# Patient Record
Sex: FEMALE | Race: WHITE | Employment: FULL TIME | URBAN - METROPOLITAN AREA
[De-identification: names, ages, dates, MRNs, and addresses within clinical notes are randomized per-mention and may not be internally consistent; named-entity substitution may affect disease eponyms.]

---

## 2017-08-09 ENCOUNTER — OFFICE VISIT (OUTPATIENT)
Dept: FAMILY MEDICINE | Facility: CLINIC | Age: 42
End: 2017-08-09
Payer: COMMERCIAL

## 2017-08-09 VITALS
HEART RATE: 63 BPM | SYSTOLIC BLOOD PRESSURE: 113 MMHG | TEMPERATURE: 98.2 F | HEIGHT: 63 IN | BODY MASS INDEX: 29.06 KG/M2 | DIASTOLIC BLOOD PRESSURE: 79 MMHG | WEIGHT: 164 LBS

## 2017-08-09 DIAGNOSIS — R22.9 LOCALIZED SUPERFICIAL SWELLING, MASS, OR LUMP: Primary | ICD-10-CM

## 2017-08-09 PROCEDURE — 99213 OFFICE O/P EST LOW 20 MIN: CPT | Performed by: FAMILY MEDICINE

## 2017-08-09 ASSESSMENT — PATIENT HEALTH QUESTIONNAIRE - PHQ9
5. POOR APPETITE OR OVEREATING: NOT AT ALL
5. POOR APPETITE OR OVEREATING: NOT AT ALL
SUM OF ALL RESPONSES TO PHQ QUESTIONS 1-9: 5

## 2017-08-09 ASSESSMENT — ANXIETY QUESTIONNAIRES
GAD7 TOTAL SCORE: 0
6. BECOMING EASILY ANNOYED OR IRRITABLE: SEVERAL DAYS
1. FEELING NERVOUS, ANXIOUS, OR ON EDGE: SEVERAL DAYS
1. FEELING NERVOUS, ANXIOUS, OR ON EDGE: NOT AT ALL
2. NOT BEING ABLE TO STOP OR CONTROL WORRYING: NOT AT ALL
3. WORRYING TOO MUCH ABOUT DIFFERENT THINGS: NOT AT ALL
2. NOT BEING ABLE TO STOP OR CONTROL WORRYING: NOT AT ALL
5. BEING SO RESTLESS THAT IT IS HARD TO SIT STILL: NOT AT ALL
7. FEELING AFRAID AS IF SOMETHING AWFUL MIGHT HAPPEN: NOT AT ALL
6. BECOMING EASILY ANNOYED OR IRRITABLE: NOT AT ALL
5. BEING SO RESTLESS THAT IT IS HARD TO SIT STILL: NOT AT ALL
GAD7 TOTAL SCORE: 2
7. FEELING AFRAID AS IF SOMETHING AWFUL MIGHT HAPPEN: NOT AT ALL
3. WORRYING TOO MUCH ABOUT DIFFERENT THINGS: NOT AT ALL

## 2017-08-09 NOTE — LETTER
My Depression Action Plan  Name: Romina Turner   Date of Birth 1975  Date: 8/9/2017    My doctor: He Duarte   My clinic: [facility]  [address] [address]  [phone]          GREEN    ZONE   Good Control    What it looks like:     Things are going generally well. You have normal up s and down s. You may even feel depressed from time to time, but bad moods usually last less than a day.   What you need to do:  1. Continue to care for yourself (see self care plan)  2. Check your depression survival kit and update it as needed  3. Follow your physician s recommendations including any medication.  4. Do not stop taking medication unless you consult with your physician first.           YELLOW         ZONE Getting Worse    What it looks like:     Depression is starting to interfere with your life.     It may be hard to get out of bed; you may be starting to isolate yourself from others.    Symptoms of depression are starting to last most all day and this has happened for several days.     You may have suicidal thoughts but they are not constant.   What you need to do:     1. Call your care team, your response to treatment will improve if you keep your care team informed of your progress. Yellow periods are signs an adjustment may need to be made.     2. Continue your self-care, even if you have to fake it!    3. Talk to someone in your support network    4. Open up your depression survival kit           RED    ZONE Medical Alert - Get Help    What it looks like:     Depression is seriously interfering with your life.     You may experience these or other symptoms: You can t get out of bed most days, can t work or engage in other necessary activities, you have trouble taking care of basic hygiene, or basic responsibilities, thoughts of suicide or death that will not go away, self-injurious behavior.     What you need to do:  1. Call your care team  and request a same-day appointment. If they are not available (weekends or after hours) call your local crisis line, emergency room or 911.      Electronically signed by: Raegan Jain, August 9, 2017    Depression Self Care Plan / Survival Kit    Self-Care for Depression  Here s the deal. Your body and mind are really not as separate as most people think.  What you do and think affects how you feel and how you feel influences what you do and think. This means if you do things that people who feel good do, it will help you feel better.  Sometimes this is all it takes.  There is also a place for medication and therapy depending on how severe your depression is, so be sure to consult with your medical provider and/ or Behavioral Health Consultant if your symptoms are worsening or not improving.     In order to better manage my stress, I will:    Exercise  Get some form of exercise, every day. This will help reduce pain and release endorphins, the  feel good  chemicals in your brain. This is almost as good as taking antidepressants!  This is not the same as joining a gym and then never going! (they count on that by the way ) It can be as simple as just going for a walk or doing some gardening, anything that will get you moving.      Hygiene   Maintain good hygiene (Get out of bed in the morning, Make your bed, Brush your teeth, Take a shower, and Get dressed like you were going to work, even if you are unemployed).  If your clothes don't fit try to get ones that do.    Diet  I will strive to eat foods that are good for me, drink plenty of water, and avoid excessive sugar, caffeine, alcohol, and other mood-altering substances.  Some foods that are helpful in depression are: complex carbohydrates, B vitamins, flaxseed, fish or fish oil, fresh fruits and vegetables.    Psychotherapy  I agree to participate in Individual Therapy (if recommended).    Medication  If prescribed medications, I agree to take them.   Missing doses can result in serious side effects.  I understand that drinking alcohol, or other illicit drug use, may cause potential side effects.  I will not stop my medication abruptly without first discussing it with my provider.    Staying Connected With Others  I will stay in touch with my friends, family members, and my primary care provider/team.    Use your imagination  Be creative.  We all have a creative side; it doesn t matter if it s oil painting, sand castles, or mud pies! This will also kick up the endorphins.    Witness Beauty  (AKA stop and smell the roses) Take a look outside, even in mid-winter. Notice colors, textures. Watch the squirrels and birds.     Service to others  Be of service to others.  There is always someone else in need.  By helping others we can  get out of ourselves  and remember the really important things.  This also provides opportunities for practicing all the other parts of the program.    Humor  Laugh and be silly!  Adjust your TV habits for less news and crime-drama and more comedy.    Control your stress  Try breathing deep, massage therapy, biofeedback, and meditation. Find time to relax each day.     My support system    Clinic Contact:  Phone number:    Contact 1:  Phone number:    Contact 2:  Phone number:    Oriental orthodox/:  Phone number:    Therapist:  Phone number:    Local crisis center:    Phone number:    Other community support:  Phone number:

## 2017-08-09 NOTE — MR AVS SNAPSHOT
After Visit Summary   8/9/2017    Romina Turner    MRN: [id number]           Patient Information     Date Of Birth          1975        Visit Information        Provider Department      8/9/2017 8:20 AM He Duarte MD [facility]        Today's Diagnoses     Localized superficial swelling, mass, or lump    -  1      Care Instructions          Thank you for choosing [facility].  You may be receiving a survey in the mail from [facility] regarding your visit today.  Please take a few minutes to complete and return the survey to let us know how we are doing.      If you have questions or concerns, please contact us via Movellas or you can contact your care team at [phone].    Our Clinic hours are:  Monday 6:40 am  to 7:00 pm  Tuesday -Friday 6:40 am to 5:00 pm    The [place] outpatient lab hours are:  Monday - Friday 6:10 am to 4:45 pm  Saturdays 7:00 am to 11:00 am  Appointments are required, call [phone]    If you have clinical questions after hours or would like to schedule an appointment,  call the clinic at [phone].          Follow-ups after your visit        Future tests that were ordered for you today     Open Future Orders        Priority Expected Expires Ordered    US Chest Wall Routine  8/9/2018 8/9/2017            Who to contact     If you have questions or need follow up information about today's clinic visit or your schedule please contact [facility] directly at [phone].  Normal or non-critical lab and imaging results will be communicated to you by [employer], letter or phone within 4 business days after the clinic has received the results. If you do not hear from us within 7 days, please contact the clinic through Unfoldt or phone. If you have a critical or abnormal lab result, we will notify you by phone as soon as possible.  Submit refill requests through [employer]ellas or call your pharmacy and they will forward the refill  "request to us. Please allow 3 business days for your refill to be completed.          Additional Information About Your Visit        MyChart Information     [email or web address] lets you send messages to your doctor, view your test results, renew your prescriptions, schedule appointments and more. To sign up, go to [email or web address] . Click on \"Log in\" on the left side of the screen, which will take you to the Welcome page. Then click on \"Sign up Now\" on the right side of the page.     You will be asked to enter the access code listed below, as well as some personal information. Please follow the directions to create your username and password.     Your access code is: [email or web address]  Expires: 2017  9:15 AM     Your access code will  in 90 days. If you need help or a new code, please call your [place] clinic or [phone].        Care EveryWhere ID     This is your Care EveryWhere ID. This could be used by other organizations to access your [place] medical records  [id number]        Your Vitals Were     Pulse Temperature Height BMI (Body Mass Index)          63 98.2  F (36.8  C) (Tympanic) 5' 3\" (1.6 m) 29.05 kg/m2         Blood Pressure from Last 3 Encounters:   17 113/79   09/15/16 112/77   16 117/75    Weight from Last 3 Encounters:   17 164 lb (74.4 kg)   09/15/16 159 lb (72.1 kg)   16 153 lb (69.4 kg)              We Performed the Following     DEPRESSION ACTION PLAN (DAP)        Primary Care Provider Office Phone # Fax #    He Duarte [phone] [address] [facility] [address]        Equal Access to Services     ESPERANZA HANDLEY : [place], kranthi macario, jeremi lin. So [facility] [phone].    ATENCIÓN: Si habla español, tiene a hdz disposición servicios gratuitos de asistencia lingüística. Stephan al [phone].    We comply with applicable federal civil rights " laws and [place] laws. We do not discriminate on the basis of race, color, national origin, age, disability sex, sexual orientation or gender identity.            Thank you!     Thank you for choosing [facility]  for your care. Our goal is always to provide you with excellent care. Hearing back from our patients is one way we can continue to improve our services. Please take a few minutes to complete the written survey that you may receive in the mail after your visit with us. Thank you!             Your Updated Medication List - Protect others around you: Learn how to safely use, store and throw away your medicines at www.disposemymeds.org.          This list is accurate as of: 8/9/17  9:15 AM.  Always use your most recent med list.                   Brand Name Dispense Instructions for use Diagnosis    escitalopram 10 MG tablet    LEXAPRO    30 tablet    Take 1 tablet (10 mg) by mouth daily    Depression with anxiety       LAMICTAL  MG Tbdp ODT tab   Generic drug:  lamoTRIgine     60 tablet    Take 1 tablet (100 mg) by mouth daily        NO ACTIVE MEDICATIONS      .        oxybutynin 5 MG 24 hr tablet    DITROPAN XL    30 tablet    Take 1 tablet (5 mg) by mouth daily    Female stress incontinence       valACYclovir 1000 mg tablet    VALTREX    21 tablet    Take 1 tablet (1,000 mg) by mouth 3 times daily for 7 days    Herpes zoster without complication

## 2017-08-09 NOTE — PROGRESS NOTES
SUBJECTIVE:                                                    Romina Turner is a 42 year old female who presents to clinic today for the following health issues:      Concern - Lump R side under breast   Onset: 2 weeks     Description:   Patient noticed lump 2 weeks ago it is uncomfortable with sitting and at night     Intensity: moderate    Progression of Symptoms:  same    Accompanying Signs & Symptoms:  None     Previous history of similar problem:   None     Precipitating factors:   Worsened by: in the evening and sitting     Alleviating factors:  Improved by: none     Therapies Tried and outcome: none       History as above, here for a lump discovered about two weeks ago. Lump is underneath her  Right breast in the rib cage area. Lump is pea sized and tender to the touch. She recalls no trauma to the chest wall. No other systemic symptoms reported.       Problem list and histories reviewed & adjusted, as indicated.  Additional history: as documented    Patient Active Problem List   Diagnosis     CARDIOVASCULAR SCREENING; LDL GOAL LESS THAN 160     Depression with anxiety     History reviewed. No pertinent surgical history.    Social History   Substance Use Topics     Smoking status: Never Smoker     Smokeless tobacco: Never Used     Alcohol use Yes      Comment: RARE     Family History   Problem Relation Age of Onset     Hypertension Mother      Eye Disorder Mother      HEART DISEASE Mother      Depression Mother      Hyperlipidemia Mother      CANCER Father      BLADER     Family History Negative No family hx of          Current Outpatient Prescriptions   Medication Sig Dispense Refill     lamoTRIgine (LAMICTAL ODT) 100 MG TBDP Take 1 tablet (100 mg) by mouth daily 60 tablet      oxybutynin (DITROPAN XL) 5 MG 24 hr tablet Take 1 tablet (5 mg) by mouth daily (Patient not taking: Reported on 8/9/2017) 30 tablet 3     escitalopram (LEXAPRO) 10 MG tablet Take 1 tablet (10 mg) by mouth daily (Patient not  "taking: Reported on 8/9/2017) 30 tablet 1     valACYclovir (VALTREX) 1000 mg tablet Take 1 tablet (1,000 mg) by mouth 3 times daily for 7 days 21 tablet 0     NO ACTIVE MEDICATIONS .       Allergies   Allergen Reactions     Penicillins Rash     Sulfa Drugs Rash         Reviewed and updated as needed this visit by clinical staffAllergies  Med Hx  Surg Hx  Fam Hx  Soc Hx      Reviewed and updated as needed this visit by Provider         ROS:  Constitutional, HEENT, cardiovascular, pulmonary, gi and gu systems are negative, except as otherwise noted.      OBJECTIVE:   /79 (BP Location: Left arm, Cuff Size: Adult Regular)  Pulse 63  Temp 98.2  F (36.8  C) (Tympanic)  Ht 5' 3\" (1.6 m)  Wt 164 lb (74.4 kg)  BMI 29.05 kg/m2  Body mass index is 29.05 kg/(m^2).  GENERAL: healthy, alert and no distress  BREAST: normal without masses, tenderness or nipple discharge and no palpable axillary masses or adenopathy  Chest Wall : Pea sized lump inferior to the right breast , tender to palpation.   Diagnostic Test Results:  none     ASSESSMENT/PLAN:         (R22.9) Localized superficial swelling, mass, or lump  (primary encounter diagnosis)  Comment: Patient referred for ultrasound  Plan: US Chest Wall              FUTURE APPOINTMENTS:       - Follow-up office as needed.    He Duarte MD  [facility]  "

## 2017-08-09 NOTE — PATIENT INSTRUCTIONS
Thank you for choosing [facility].  You may be receiving a survey in the mail from [employer] regarding your visit today.  Please take a few minutes to complete and return the survey to let us know how we are doing.      If you have questions or concerns, please contact us via Qiandao or you can contact your care team at [phone].    Our Clinic hours are:  Monday 6:40 am  to 7:00 pm  Tuesday -Friday 6:40 am to 5:00 pm    The [place] outpatient lab hours are:  Monday - Friday 6:10 am to 4:45 pm  Saturdays 7:00 am to 11:00 am  Appointments are required, call [phone]    If you have clinical questions after hours or would like to schedule an appointment,  call the clinic at [phone].

## 2017-08-09 NOTE — NURSING NOTE
"Chief Complaint   Patient presents with     Mass       Initial /79 (BP Location: Left arm, Cuff Size: Adult Regular)  Pulse 63  Temp 98.2  F (36.8  C) (Tympanic)  Ht 5' 3\" (1.6 m)  Wt 164 lb (74.4 kg)  BMI 29.05 kg/m2 Estimated body mass index is 29.05 kg/(m^2) as calculated from the following:    Height as of this encounter: 5' 3\" (1.6 m).    Weight as of this encounter: 164 lb (74.4 kg).  Medication Reconciliation: complete  "

## 2017-08-10 ASSESSMENT — ANXIETY QUESTIONNAIRES: GAD7 TOTAL SCORE: 0

## 2017-11-10 ENCOUNTER — OFFICE VISIT (OUTPATIENT)
Dept: FAMILY MEDICINE | Facility: CLINIC | Age: 42
End: 2017-11-10
Payer: COMMERCIAL

## 2017-11-10 ENCOUNTER — HOSPITAL ENCOUNTER (OUTPATIENT)
Dept: ULTRASOUND IMAGING | Facility: CLINIC | Age: 42
Discharge: HOME | End: 2017-11-10
Attending: FAMILY MEDICINE | Admitting: FAMILY MEDICINE
Payer: COMMERCIAL

## 2017-11-10 VITALS
SYSTOLIC BLOOD PRESSURE: 113 MMHG | TEMPERATURE: 98.9 F | BODY MASS INDEX: 29.05 KG/M2 | WEIGHT: 164 LBS | HEART RATE: 86 BPM | DIASTOLIC BLOOD PRESSURE: 74 MMHG

## 2017-11-10 DIAGNOSIS — R22.9 LOCALIZED SUPERFICIAL SWELLING, MASS, OR LUMP: ICD-10-CM

## 2017-11-10 DIAGNOSIS — R07.89 XIPHOID PAIN: Primary | ICD-10-CM

## 2017-11-10 PROCEDURE — 99213 OFFICE O/P EST LOW 20 MIN: CPT | Performed by: FAMILY MEDICINE

## 2017-11-10 PROCEDURE — 76604 US EXAM CHEST: CPT

## 2017-11-10 NOTE — MR AVS SNAPSHOT
After Visit Summary   11/10/2017    Romina Turner    MRN: [id number]           Patient Information     Date Of Birth          1975        Visit Information        Provider Department      11/10/2017 11:00 AM He Duarte MD [facility]        Today's Diagnoses     Xiphoid pain    -  1      Care Instructions      Chest Wall Pain: Costochondritis    The chest pain that you have had today is caused by costochondritis. This condition is caused by an inflammation of the cartilage joining your ribs to your breastbone. It is not caused by heart or lung problems. Your healthcare team has made sure that the chest pain you feel is not from a life threatening cause of chest pain such as heart attack, collapsed lung, blood clot in the lung, tear in the aorta, or esophageal rupture. The inflammation may have been brought on by a blow to the chest, lifting heavy objects, intense exercise, or an illness that made you cough and sneeze a lot. It often occurs during times of emotional stress. It can be painful, but it is not dangerous. It usually goes away in 1 to 2 weeks. But it may happen again. Rarely, a more serious condition may cause symptoms similar to costochondritis. That s why it s important to watch for the warning signs listed below.  Home care  Follow these guidelines when caring for yourself at home:    If you feel that emotional stress is a cause of your condition, try to figure out the sources of that stress. It may not be obvious. Learn ways to deal with the stress in your life. This can include regular exercise, muscle relaxation, meditation, or simply taking time out for yourself.    You may use acetaminophen, ibuprofen, or naproxen to control pain, unless another pain medicine was prescribed. If you have liver or kidney disease or ever had a stomach ulcer, talk with your healthcare provider before using these medicines.    You can also help ease pain by using a hot,  wet compress or heating pad. Use this with or without a medicated skin cream that helps relieves pain.    Do stretching exercise as advised by your provider.    Take any prescribed medicines as directed.  Follow-up care  Follow up with your healthcare provider, or as advised, if you do not start to get better in the next 2 days.  When to seek medical advice  Call your healthcare provider right away if any of these occur:    A change in the type of pain. Call if it feels different, becomes more serious, lasts longer, or spreads into your shoulder, arm, neck, jaw, or back.    Shortness of breath or pain gets worse when you breathe    Weakness, dizziness, or fainting    Cough with dark-colored sputum (phlegm) or blood    Abdominal pain    Dark red or black stools    Fever of 100.4 F (38 C) or higher, or as directed by your healthcare provider  Date Last Reviewed: 12/1/2016 2000-2017 [employer]. [address] [place] [address]. All rights reserved. This information is not intended as a substitute for professional medical care. Always follow your healthcare professional's instructions.                Follow-ups after your visit        Future tests that were ordered for you today     Open Future Orders        Priority Expected Expires Ordered    CT Chest w/o Contrast Routine  11/10/2018 11/10/2017            Who to contact     If you have questions or need follow up information about today's clinic visit or your schedule please contact [facility] directly at [phone].  Normal or non-critical lab and imaging results will be communicated to you by MyChart, letter or phone within 4 business days after the clinic has received the results. If you do not hear from us within 7 days, please contact the clinic through MyChart or phone. If you have a critical or abnormal lab result, we will notify you by phone as soon as possible.  Submit refill requests through [employer]ous[employer]hart or call your  "pharmacy and they will forward the refill request to us. Please allow 3 business days for your refill to be completed.          Additional Information About Your Visit        MyChart Information     [employer] lets you send messages to your doctor, view your test results, renew your prescriptions, schedule appointments and more. To sign up, go to [email or web address] . Click on \"Log in\" on the left side of the screen, which will take you to the Welcome page. Then click on \"Sign up Now\" on the right side of the page.     You will be asked to enter the access code listed below, as well as some personal information. Please follow the directions to create your username and password.     Your access code is: [email or web address]  Expires: 2018 11:25 AM     Your access code will  in 90 days. If you need help or a new code, please call your [place] clinic or [phone].        Care EveryWhere ID     This is your Care EveryWhere ID. This could be used by other organizations to access your [place] medical records  [id number]        Your Vitals Were     Pulse Temperature BMI (Body Mass Index)             86 98.9  F (37.2  C) (Tympanic) 29.05 kg/m2          Blood Pressure from Last 3 Encounters:   11/10/17 113/74   17 113/79   09/15/16 112/77    Weight from Last 3 Encounters:   11/10/17 164 lb (74.4 kg)   17 164 lb (74.4 kg)   09/15/16 159 lb (72.1 kg)               Primary Care Provider Office Phone # Fax #    Emilesandra Oumar Duarte [phone] [address] [facility] [address]        Equal Access to Services     MIKE HANDLEY : [place], kranthi macario, jeremi lin. So [facility] [phone].    ATENCIÓN: Si habla español, tiene a hdz disposición servicios gratuitos de asistencia lingüística. Llame al [phone].    We comply with applicable federal civil rights laws and [place] laws. We do not " discriminate on the basis of race, color, national origin, age, disability, sex, sexual orientation, or gender identity.            Thank you!     Thank you for choosing [facility]  for your care. Our goal is always to provide you with excellent care. Hearing back from our patients is one way we can continue to improve our services. Please take a few minutes to complete the written survey that you may receive in the mail after your visit with us. Thank you!             Your Updated Medication List - Protect others around you: Learn how to safely use, store and throw away your medicines at www.disposemymeds.org.          This list is accurate as of: 11/10/17 11:25 AM.  Always use your most recent med list.                   Brand Name Dispense Instructions for use Diagnosis    escitalopram 10 MG tablet    LEXAPRO    30 tablet    Take 1 tablet (10 mg) by mouth daily    Depression with anxiety       LAMICTAL  MG Tbdp ODT tab   Generic drug:  lamoTRIgine     60 tablet    Take 1 tablet (100 mg) by mouth daily        NO ACTIVE MEDICATIONS      .        oxybutynin 5 MG 24 hr tablet    DITROPAN XL    30 tablet    Take 1 tablet (5 mg) by mouth daily    Female stress incontinence       valACYclovir 1000 mg tablet    VALTREX    21 tablet    Take 1 tablet (1,000 mg) by mouth 3 times daily for 7 days    Herpes zoster without complication

## 2017-11-10 NOTE — NURSING NOTE
"Initial /74 (BP Location: Left arm, Patient Position: Chair, Cuff Size: Adult Regular)  Pulse 86  Temp 98.9  F (37.2  C) (Tympanic)  Wt 164 lb (74.4 kg)  BMI 29.05 kg/m2 Estimated body mass index is 29.05 kg/(m^2) as calculated from the following:    Height as of 8/9/17: 5' 3\" (1.6 m).    Weight as of this encounter: 164 lb (74.4 kg). .    Jazmine Olivarez    "

## 2017-11-10 NOTE — PROGRESS NOTES
SUBJECTIVE:   Romina Turner is a 42 year old female who presents to clinic today for the following health issues:      Follow up on lump-sternum. States it is worse    Patient is a 42 yr old female here for lump on her chest wall . She had an ultrasound which was negative . She noticed the pain more with bending and crouching. No association with breathing .    Problem list and histories reviewed & adjusted, as indicated.  Additional history: as documented    Patient Active Problem List   Diagnosis     CARDIOVASCULAR SCREENING; LDL GOAL LESS THAN 160     Depression with anxiety     No past surgical history on file.    Social History   Substance Use Topics     Smoking status: Never Smoker     Smokeless tobacco: Never Used     Alcohol use Yes      Comment: RARE     Family History   Problem Relation Age of Onset     Hypertension Mother      Eye Disorder Mother      HEART DISEASE Mother      Depression Mother      Hyperlipidemia Mother      CANCER Father      BLADER     Family History Negative No family hx of          Current Outpatient Prescriptions   Medication Sig Dispense Refill     lamoTRIgine (LAMICTAL ODT) 100 MG TBDP Take 1 tablet (100 mg) by mouth daily 60 tablet      oxybutynin (DITROPAN XL) 5 MG 24 hr tablet Take 1 tablet (5 mg) by mouth daily (Patient not taking: Reported on 8/9/2017) 30 tablet 3     escitalopram (LEXAPRO) 10 MG tablet Take 1 tablet (10 mg) by mouth daily (Patient not taking: Reported on 8/9/2017) 30 tablet 1     valACYclovir (VALTREX) 1000 mg tablet Take 1 tablet (1,000 mg) by mouth 3 times daily for 7 days 21 tablet 0     NO ACTIVE MEDICATIONS .       Allergies   Allergen Reactions     Penicillins Rash     Sulfa Drugs Rash     BP Readings from Last 3 Encounters:   11/10/17 113/74   08/09/17 113/79   09/15/16 112/77    Wt Readings from Last 3 Encounters:   11/10/17 164 lb (74.4 kg)   08/09/17 164 lb (74.4 kg)   09/15/16 159 lb (72.1 kg)                  Labs reviewed in  EPIC        Reviewed and updated as needed this visit by clinical staff       Reviewed and updated as needed this visit by Provider         ROS:  Constitutional, HEENT, cardiovascular, pulmonary, gi and gu systems are negative, except as otherwise noted.      OBJECTIVE:   /74 (BP Location: Left arm, Patient Position: Chair, Cuff Size: Adult Regular)  Pulse 86  Temp 98.9  F (37.2  C) (Tympanic)  Wt 164 lb (74.4 kg)  BMI 29.05 kg/m2  Body mass index is 29.05 kg/(m^2).  GENERAL: healthy, alert and no distress  NECK: no adenopathy, no asymmetry, masses, or scars and thyroid normal to palpation  RESP: lungs clear to auscultation - no rales, rhonchi or wheezes  CV: regular rate and rhythm, normal S1 S2, no S3 or S4, no murmur, click or rub, no peripheral edema and peripheral pulses strong  MS: no gross musculoskeletal defects noted, no edema    Diagnostic Test Results:  none     ASSESSMENT/PLAN:         1. Xiphoid pain  Patient will be notified of results   - CT Chest w/o Contrast; Future    FUTURE APPOINTMENTS:       - Follow-up visit as needed    He Duarte MD  [facility]

## 2017-11-10 NOTE — PATIENT INSTRUCTIONS
Chest Wall Pain: Costochondritis    The chest pain that you have had today is caused by costochondritis. This condition is caused by an inflammation of the cartilage joining your ribs to your breastbone. It is not caused by heart or lung problems. Your healthcare team has made sure that the chest pain you feel is not from a life threatening cause of chest pain such as heart attack, collapsed lung, blood clot in the lung, tear in the aorta, or esophageal rupture. The inflammation may have been brought on by a blow to the chest, lifting heavy objects, intense exercise, or an illness that made you cough and sneeze a lot. It often occurs during times of emotional stress. It can be painful, but it is not dangerous. It usually goes away in 1 to 2 weeks. But it may happen again. Rarely, a more serious condition may cause symptoms similar to costochondritis. That s why it s important to watch for the warning signs listed below.  Home care  Follow these guidelines when caring for yourself at home:    If you feel that emotional stress is a cause of your condition, try to figure out the sources of that stress. It may not be obvious. Learn ways to deal with the stress in your life. This can include regular exercise, muscle relaxation, meditation, or simply taking time out for yourself.    You may use acetaminophen, ibuprofen, or naproxen to control pain, unless another pain medicine was prescribed. If you have liver or kidney disease or ever had a stomach ulcer, talk with your healthcare provider before using these medicines.    You can also help ease pain by using a hot, wet compress or heating pad. Use this with or without a medicated skin cream that helps relieves pain.    Do stretching exercise as advised by your provider.    Take any prescribed medicines as directed.  Follow-up care  Follow up with your healthcare provider, or as advised, if you do not start to get better in the next 2 days.  When to seek medical  advice  Call your healthcare provider right away if any of these occur:    A change in the type of pain. Call if it feels different, becomes more serious, lasts longer, or spreads into your shoulder, arm, neck, jaw, or back.    Shortness of breath or pain gets worse when you breathe    Weakness, dizziness, or fainting    Cough with dark-colored sputum (phlegm) or blood    Abdominal pain    Dark red or black stools    Fever of 100.4 F (38 C) or higher, or as directed by your healthcare provider  Date Last Reviewed: 12/1/2016 2000-2017 [employer]. [address] [place] [address]. All rights reserved. This information is not intended as a substitute for professional medical care. Always follow your healthcare professional's instructions.

## 2017-11-14 ENCOUNTER — HOSPITAL ENCOUNTER (OUTPATIENT)
Dept: CT IMAGING | Facility: CLINIC | Age: 42
Discharge: HOME | End: 2017-11-14
Attending: FAMILY MEDICINE | Admitting: FAMILY MEDICINE
Payer: COMMERCIAL

## 2017-11-14 DIAGNOSIS — R07.89 XIPHOID PAIN: ICD-10-CM

## 2017-11-14 PROCEDURE — 71250 CT THORAX DX C-: CPT

## 2017-11-16 ENCOUNTER — TELEPHONE (OUTPATIENT)
Dept: FAMILY MEDICINE | Facility: CLINIC | Age: 42
End: 2017-11-16

## 2017-11-16 DIAGNOSIS — N63.10 BREAST MASS, RIGHT: Primary | ICD-10-CM

## 2017-11-16 NOTE — TELEPHONE ENCOUNTER
The ultrasound that was done was not on her breast , for any mass seen on breast an ultrasound is always indicated.

## 2017-11-16 NOTE — TELEPHONE ENCOUNTER
I have given the pt the below information. She is questioning the need for a repeat US as one was just done on 11/10/17.   Please advise on this question.   Thank you,  Ann Hodge RN

## 2017-11-16 NOTE — PROGRESS NOTES
Please inform patient that test result CT scan showed a soft tissue swelling in her right breast. It was recommended that she get a mammogram to investigate this I will place the order for this. There was also a 5mm nodule seen on her right lung. If she was a smoker she will need that rechecked in one year.  Thank you.     He Duarte M.D.

## 2017-11-16 NOTE — TELEPHONE ENCOUNTER
The below information has been given to the pt. She has these two tests scheduled.   Ann Hodge RN

## 2017-11-16 NOTE — TELEPHONE ENCOUNTER
Notes Recorded by He Duarte MD on 11/16/2017 at 7:17 AM  Please inform patient that test result CT scan showed a soft tissue swelling in her right breast. It was recommended that she get a mammogram to investigate this I will place the order for this. There was also a 5mm nodule seen on her right lung. If she was a smoker she will need that rechecked in one year.  Thank you.     He Duarte M.D.

## 2017-11-16 NOTE — TELEPHONE ENCOUNTER
I do not see that the mammogram order has been placed. Can you please place the order.  Thank you,  Ann Hodge RN

## 2017-11-28 ENCOUNTER — TELEPHONE (OUTPATIENT)
Dept: FAMILY MEDICINE | Facility: CLINIC | Age: 42
End: 2017-11-28

## 2017-11-28 ENCOUNTER — HOSPITAL ENCOUNTER (OUTPATIENT)
Dept: ULTRASOUND IMAGING | Facility: CLINIC | Age: 42
End: 2017-11-28
Attending: FAMILY MEDICINE
Payer: COMMERCIAL

## 2017-11-28 ENCOUNTER — HOSPITAL ENCOUNTER (OUTPATIENT)
Dept: MAMMOGRAPHY | Facility: CLINIC | Age: 42
Discharge: HOME | End: 2017-11-28
Attending: FAMILY MEDICINE | Admitting: FAMILY MEDICINE
Payer: COMMERCIAL

## 2017-11-28 ENCOUNTER — ALLIED HEALTH/NURSE VISIT (OUTPATIENT)
Dept: FAMILY MEDICINE | Facility: CLINIC | Age: 42
End: 2017-11-28
Payer: COMMERCIAL

## 2017-11-28 DIAGNOSIS — N63.10 BREAST MASS, RIGHT: ICD-10-CM

## 2017-11-28 DIAGNOSIS — R07.89 XIPHOID PAIN: Primary | ICD-10-CM

## 2017-11-28 PROCEDURE — 99207 ZZC NO CHARGE NURSE ONLY: CPT

## 2017-11-28 PROCEDURE — 76642 ULTRASOUND BREAST LIMITED: CPT | Mod: RT

## 2017-11-28 PROCEDURE — G0204 DX MAMMO INCL CAD BI: HCPCS

## 2017-11-28 NOTE — NURSING NOTE
Pt in clinic today with negative MA and US of R breast.  States her pain continues to be present, not worse, but symptoms remain.  Pt is asking next steps given negative diagnostics at this point.  Scheduled with Dr. Duarte in clinic on Friday at 6:40 AM to discuss.  Will send Dr. Duarte note and ask if there is anything she would like to do differently with pt between now and then?    Amanda LEWIS RN

## 2017-11-28 NOTE — TELEPHONE ENCOUNTER
Pt in clinic today after completing MA and US of R breast and given negative results by radiologist.  States her pain continues to be present, not worse, but symptoms remain.  Pt is asking next steps given negative diagnostics at this point.  Scheduled with Dr. Duarte in clinic on Friday at 6:40 AM to discuss.  Dr. Duarte is there anything you would like to do differently with pt between now and then?    Amanda LEWIS RN

## 2017-11-28 NOTE — MR AVS SNAPSHOT
"              After Visit Summary   11/28/2017    Romina Turner    MRN: [id number]           Patient Information     Date Of Birth          1975        Visit Information        Provider Department      11/28/2017 11:15 AM CRISTINA MAYFIELD/LAXMI PEGUERO [facility]        Today's Diagnoses     Xiphoid pain    -  1       Follow-ups after your visit        Your next 10 appointments already scheduled     Dec 01, 2017  6:40 AM CST   [facility] with He Duarte MD   [facility]    [address] [facility] [id number] [phone]              Who to contact     If you have questions or need follow up information about today's clinic visit or your schedule please contact [facility] directly at [phone].  Normal or non-critical lab and imaging results will be communicated to you by MyChart, letter or phone within 4 business days after the clinic has received the results. If you do not hear from us within 7 days, please contact the clinic through MyChart or phone. If you have a critical or abnormal lab result, we will notify you by phone as soon as possible.  Submit refill requests through [employer] or call your pharmacy and they will forward the refill request to us. Please allow 3 business days for your refill to be completed.          Additional Information About Your Visit        MyChart Information     [employer] lets you send messages to your doctor, view your test results, renew your prescriptions, schedule appointments and more. To sign up, go to [email or web address] . Click on \"Log in\" on the left side of the screen, which will take you to the Welcome page. Then click on \"Sign up Now\" on the right side of the page.     You will be asked to enter the access code listed below, as well as some personal information. Please follow the directions to create your username and password.     Your access code is: [email or web address]  Expires: 2/8/2018 " 11:25 AM     Your access code will  in 90 days. If you need help or a new code, please call your [place] clinic or [phone].        Care EveryWhere ID     This is your Care EveryWhere ID. This could be used by other organizations to access your [place] medical records  [id number]         Blood Pressure from Last 3 Encounters:   11/10/17 113/74   17 113/79   09/15/16 112/77    Weight from Last 3 Encounters:   11/10/17 164 lb (74.4 kg)   17 164 lb (74.4 kg)   09/15/16 159 lb (72.1 kg)              Today, you had the following     No orders found for display       Primary Care Provider Office Phone # Fax #    He Duarte [phone] [address] [facility] [address]        Equal Access to Services     ESPERANZA HANDLEY : Hadii aad ku hadasho Sojjali, waaxda luqadaha, qaybta kaalmada adeegyada, waxay flaquitain hayarvinn pratima brown . So [facility] [phone].    ATENCIÓN: Si habla español, tiene a hdz disposición servicios gratuitos de asistencia lingüística. Llame al [phone].    We comply with applicable federal civil rights laws and [place] laws. We do not discriminate on the basis of race, color, national origin, age, disability, sex, sexual orientation, or gender identity.            Thank you!     Thank you for choosing [facility]  for your care. Our goal is always to provide you with excellent care. Hearing back from our patients is one way we can continue to improve our services. Please take a few minutes to complete the written survey that you may receive in the mail after your visit with us. Thank you!             Your Updated Medication List - Protect others around you: Learn how to safely use, store and throw away your medicines at www.disposemymeds.org.          This list is accurate as of: 17 11:20 AM.  Always use your most recent med list.                   Brand Name Dispense Instructions for use Diagnosis     escitalopram 10 MG tablet    LEXAPRO    30 tablet    Take 1 tablet (10 mg) by mouth daily    Depression with anxiety       LAMICTAL  MG Tbdp ODT tab   Generic drug:  lamoTRIgine     60 tablet    Take 1 tablet (100 mg) by mouth daily        NO ACTIVE MEDICATIONS      .        oxybutynin 5 MG 24 hr tablet    DITROPAN XL    30 tablet    Take 1 tablet (5 mg) by mouth daily    Female stress incontinence       valACYclovir 1000 mg tablet    VALTREX    21 tablet    Take 1 tablet (1,000 mg) by mouth 3 times daily for 7 days    Herpes zoster without complication

## 2017-12-01 ENCOUNTER — OFFICE VISIT (OUTPATIENT)
Dept: FAMILY MEDICINE | Facility: CLINIC | Age: 42
End: 2017-12-01
Payer: COMMERCIAL

## 2017-12-01 VITALS
HEART RATE: 72 BPM | HEIGHT: 63 IN | DIASTOLIC BLOOD PRESSURE: 75 MMHG | BODY MASS INDEX: 28.88 KG/M2 | WEIGHT: 163 LBS | TEMPERATURE: 97.9 F | SYSTOLIC BLOOD PRESSURE: 113 MMHG

## 2017-12-01 DIAGNOSIS — R10.11 ABDOMINAL PAIN, RIGHT UPPER QUADRANT: ICD-10-CM

## 2017-12-01 DIAGNOSIS — R07.89 XIPHOID PAIN: Primary | ICD-10-CM

## 2017-12-01 PROCEDURE — 99214 OFFICE O/P EST MOD 30 MIN: CPT | Performed by: FAMILY MEDICINE

## 2017-12-01 RX ORDER — TRAMADOL HYDROCHLORIDE 50 MG/1
50 TABLET ORAL EVERY 6 HOURS PRN
Qty: 20 TABLET | Refills: 0 | Status: SHIPPED | OUTPATIENT
Start: 2017-12-01 | End: 2017-12-15

## 2017-12-01 NOTE — PROGRESS NOTES
SUBJECTIVE:   Romina Turner is a 42 year old female who presents to clinic today for the following health issues:      Concern - xiphoid pressure   Onset: 7/ 2017    Description:   Patient has had pressure /uncomfortable feeling in the R side  of sterum area has been going on for about 6 mos seem to be worse in the evening     Intensity: moderate    Progression of Symptoms:  worsening    Accompanying Signs & Symptoms:  none    Previous history of similar problem:   none    Precipitating factors:   Worsened by: in the evening     Alleviating factors:  Improved by: postion of lying or sitting     Therapies Tried and outcome: same       42 yr old female here for continued pain in the center /right upper part of her abdomen. We have done ultrasound and CT of her chest and also ultrasound and mammogram of her breast . So far work up has been negative. She reports that the pain is constant now and more to the right of her abdomen in the upper part . There is no relation to food. Pain seems worse with position like crouching forward . No known relieving factors. Seems more noticeable in the evenings.     Problem list and histories reviewed & adjusted, as indicated.  Additional history: as documented    Patient Active Problem List   Diagnosis     CARDIOVASCULAR SCREENING; LDL GOAL LESS THAN 160     Depression with anxiety     History reviewed. No pertinent surgical history.    Social History   Substance Use Topics     Smoking status: Never Smoker     Smokeless tobacco: Never Used     Alcohol use Yes      Comment: RARE     Family History   Problem Relation Age of Onset     Hypertension Mother      Eye Disorder Mother      HEART DISEASE Mother      Depression Mother      Hyperlipidemia Mother      CANCER Father      BLADER     Family History Negative No family hx of          Current Outpatient Prescriptions   Medication Sig Dispense Refill     traMADol (ULTRAM) 50 MG tablet Take 1 tablet (50 mg) by mouth every 6 hours as  "needed for pain 20 tablet 0     lamoTRIgine (LAMICTAL ODT) 100 MG TBDP Take 1 tablet (100 mg) by mouth daily 60 tablet      oxybutynin (DITROPAN XL) 5 MG 24 hr tablet Take 1 tablet (5 mg) by mouth daily (Patient not taking: Reported on 8/9/2017) 30 tablet 3     escitalopram (LEXAPRO) 10 MG tablet Take 1 tablet (10 mg) by mouth daily (Patient not taking: Reported on 8/9/2017) 30 tablet 1     valACYclovir (VALTREX) 1000 mg tablet Take 1 tablet (1,000 mg) by mouth 3 times daily for 7 days 21 tablet 0     NO ACTIVE MEDICATIONS .       Allergies   Allergen Reactions     Penicillins Rash     Sulfa Drugs Rash     BP Readings from Last 3 Encounters:   12/01/17 113/75   11/10/17 113/74   08/09/17 113/79    Wt Readings from Last 3 Encounters:   12/01/17 163 lb (73.9 kg)   11/10/17 164 lb (74.4 kg)   08/09/17 164 lb (74.4 kg)                  Labs reviewed in EPIC        Reviewed and updated as needed this visit by clinical staffTobacco  Allergies  Med Hx  Surg Hx  Fam Hx  Soc Hx      Reviewed and updated as needed this visit by Provider         ROS:  Constitutional, HEENT, cardiovascular, pulmonary, gi and gu systems are negative, except as otherwise noted.      OBJECTIVE:   /75 (BP Location: Left arm, Cuff Size: Adult Regular)  Pulse 72  Temp 97.9  F (36.6  C) (Tympanic)  Ht 5' 3\" (1.6 m)  Wt 163 lb (73.9 kg)  BMI 28.87 kg/m2  Body mass index is 28.87 kg/(m^2).  GENERAL: healthy, alert and no distress  EYES: Eyes grossly normal to inspection, PERRL and conjunctivae and sclerae normal  HENT: ear canals and TM's normal, nose and mouth without ulcers or lesions  NECK: no adenopathy, no asymmetry, masses, or scars and thyroid normal to palpation  RESP: lungs clear to auscultation - no rales, rhonchi or wheezes  CV: regular rate and rhythm, normal S1 S2, no S3 or S4, no murmur, click or rub, no peripheral edema and peripheral pulses strong  ABDOMEN: tenderness RUQ and bowel sounds normal  MS: no gross " musculoskeletal defects noted, no edema    Diagnostic Test Results:  none     ASSESSMENT/PLAN:       1. Xiphoid pain  Patient given some pain medication   - traMADol (ULTRAM) 50 MG tablet; Take 1 tablet (50 mg) by mouth every 6 hours as needed for pain  Dispense: 20 tablet; Refill: 0    2. Abdominal pain, right upper quadrant  Patient now pointing more to the right upper quadrant , will order an ultrasound of the gallbladder.  - US Abdomen Complete; Future    FUTURE APPOINTMENTS:       - Follow-up visit as needed    He Duarte MD  [facility]

## 2017-12-01 NOTE — PATIENT INSTRUCTIONS
Thank you for choosing [facility].  You may be receiving a survey in the mail from [employer] regarding your visit today.  Please take a few minutes to complete and return the survey to let us know how we are doing.      If you have questions or concerns, please contact us via [employer] or you can contact your care team at [phone].    Our Clinic hours are:  Monday 6:40 am  to 7:00 pm  Tuesday -Friday 6:40 am to 5:00 pm    The [place] outpatient lab hours are:  Monday - Friday 6:10 am to 4:45 pm  Saturdays 7:00 am to 11:00 am  Appointments are required, call [phone]    If you have clinical questions after hours or would like to schedule an appointment,  call the clinic at [phone].

## 2017-12-01 NOTE — MR AVS SNAPSHOT
After Visit Summary   12/1/2017    Romina Turner    MRN: [id number]           Patient Information     Date Of Birth          1975        Visit Information        Provider Department      12/1/2017 6:40 AM He Duarte MD [facility]        Today's Diagnoses     Xiphoid pain    -  1    Abdominal pain, right upper quadrant          Care Instructions          Thank you for choosing [facility].  You may be receiving a survey in the mail from [facility] regarding your visit today.  Please take a few minutes to complete and return the survey to let us know how we are doing.      If you have questions or concerns, please contact us via [employer] or you can contact your care team at [phone].    Our Clinic hours are:  Monday 6:40 am  to 7:00 pm  Tuesday -Friday 6:40 am to 5:00 pm    The [place] outpatient lab hours are:  Monday - Friday 6:10 am to 4:45 pm  Saturdays 7:00 am to 11:00 am  Appointments are required, call [phone]    If you have clinical questions after hours or would like to schedule an appointment,  call the clinic at [phone].          Follow-ups after your visit        Future tests that were ordered for you today     Open Future Orders        Priority Expected Expires Ordered    US Abdomen Complete Routine  12/1/2018 12/1/2017            Who to contact     If you have questions or need follow up information about today's clinic visit or your schedule please contact [facility] directly at [phone].  Normal or non-critical lab and imaging results will be communicated to you by [place], letter or phone within 4 business days after the clinic has received the results. If you do not hear from us within 7 days, please contact the clinic through [employer] or phone. If you have a critical or abnormal lab result, we will notify you by phone as soon as possible.  Submit refill requests through [employer] or call your pharmacy and they will  "forward the refill request to us. Please allow 3 business days for your refill to be completed.          Additional Information About Your Visit        "[employer] Information     [employer] lets you send messages to your doctor, view your test results, renew your prescriptions, schedule appointments and more. To sign up, go to [email or web address] . Click on \"Log in\" on the left side of the screen, which will take you to the Welcome page. Then click on \"Sign up Now\" on the right side of the page.     You will be asked to enter the access code listed below, as well as some personal information. Please follow the directions to create your username and password.     Your access code is: [email or web address]  Expires: 2018 11:25 AM     Your access code will  in 90 days. If you need help or a new code, please call your [facility] clinic or [phone].        Care EveryWhere ID     This is your Care EveryWhere ID. This could be used by other organizations to access your [facility] medical records  [id number]        Your Vitals Were     Pulse Temperature Height BMI (Body Mass Index)          72 97.9  F (36.6  C) (Tympanic) 5' 3\" (1.6 m) 28.87 kg/m2         Blood Pressure from Last 3 Encounters:   17 113/75   11/10/17 113/74   17 113/79    Weight from Last 3 Encounters:   17 163 lb (73.9 kg)   11/10/17 164 lb (74.4 kg)   17 164 lb (74.4 kg)                 Today's Medication Changes          These changes are accurate as of: 17  7:17 AM.  If you have any questions, ask your nurse or doctor.               Start taking these medicines.        Dose/Directions    traMADol 50 MG tablet   Commonly known as:  ULTRAM   Used for:  Xiphoid pain   Started by:  He Duarte MD        Dose:  50 mg   Take 1 tablet (50 mg) by mouth every 6 hours as needed for pain   Quantity:  20 tablet   Refills:  0            Where to get your medicines      Some of these will need a paper prescription and " others can be bought over the counter.  Ask your nurse if you have questions.     Bring a paper prescription for each of these medications     traMADol 50 MG tablet                Primary Care Provider Office Phone # Fax #    He Duarte [phone] [address] [facility] [address]        Equal Access to Services     ESPERANZA HANDLEY : Hadii aad ku hadasho Soomaali, waaxda luqadaha, qaybta kaalmada adeegyada, waxay idiin hayaan adeivana khletitiaaraceli larenae . So [facility] [phone].    ATENCIÓN: Si jayashreela esplaron, tiene a hdz disposición servicios gratuitos de asistencia lingüística. Llame al [phone].    We comply with applicable federal civil rights laws and [place] laws. We do not discriminate on the basis of race, color, national origin, age, disability, sex, sexual orientation, or gender identity.            Thank you!     Thank you for choosing [facility]  for your care. Our goal is always to provide you with excellent care. Hearing back from our patients is one way we can continue to improve our services. Please take a few minutes to complete the written survey that you may receive in the mail after your visit with us. Thank you!             Your Updated Medication List - Protect others around you: Learn how to safely use, store and throw away your medicines at www.disposemymeds.org.          This list is accurate as of: 12/1/17  7:17 AM.  Always use your most recent med list.                   Brand Name Dispense Instructions for use Diagnosis    escitalopram 10 MG tablet    LEXAPRO    30 tablet    Take 1 tablet (10 mg) by mouth daily    Depression with anxiety       LAMICTAL  MG Tbdp ODT tab   Generic drug:  lamoTRIgine     60 tablet    Take 1 tablet (100 mg) by mouth daily        NO ACTIVE MEDICATIONS      .        oxybutynin 5 MG 24 hr tablet    DITROPAN XL    30 tablet    Take 1 tablet (5 mg) by mouth daily    Female stress incontinence       traMADol  50 MG tablet    ULTRAM    20 tablet    Take 1 tablet (50 mg) by mouth every 6 hours as needed for pain    Xiphoid pain       valACYclovir 1000 mg tablet    VALTREX    21 tablet    Take 1 tablet (1,000 mg) by mouth 3 times daily for 7 days    Herpes zoster without complication

## 2017-12-03 ENCOUNTER — HOSPITAL ENCOUNTER (OUTPATIENT)
Dept: ULTRASOUND IMAGING | Facility: CLINIC | Age: 42
Discharge: HOME | End: 2017-12-03
Attending: FAMILY MEDICINE | Admitting: FAMILY MEDICINE
Payer: COMMERCIAL

## 2017-12-03 DIAGNOSIS — R10.11 ABDOMINAL PAIN, RIGHT UPPER QUADRANT: ICD-10-CM

## 2017-12-03 PROCEDURE — 76700 US EXAM ABDOM COMPLETE: CPT

## 2017-12-03 NOTE — LETTER
[facility] ULTRASOUND  [address]  [place] [address]  Phone: [phone]    December 4, 2017        Romina Turner  [address] [place] [facility] [address]          Dear Romina,      IMAGING RESULTS:     The results of your recent abdominal ultrasound were NORMAL.  If you have any further questions or problems, please contact our office.  ULTRASOUND ABDOMEN COMPLETE 12/3/2017 10:28 AM      HISTORY: Abdominal pain, right upper quadrant.     COMPARISON: None.     FINDINGS: Liver is normal in echogenicity without focal solid lesions.  Gallbladder demonstrates no cholelithiasis or cholecystitis.  Extrahepatic bile duct is normal in diameter. Pancreas is normal where  visualized. Spleen is normal. Kidneys are normal in size. There is no  hydronephrosis. Visualized abdominal aorta and IVC are nonaneurysmal.         IMPRESSION: Negative abdominal ultrasound.     LUCINDA MCLAIN MD    Sincerely,        He Duarte MD / alexander

## 2017-12-04 NOTE — PROGRESS NOTES
Please inform patient that test result was within normal parameters.   Thank you.     He Duarte M.D.

## 2019-08-05 ENCOUNTER — OFFICE VISIT (OUTPATIENT)
Dept: FAMILY MEDICINE | Facility: CLINIC | Age: 44
End: 2019-08-05
Payer: COMMERCIAL

## 2019-08-05 VITALS
DIASTOLIC BLOOD PRESSURE: 84 MMHG | OXYGEN SATURATION: 99 % | WEIGHT: 170 LBS | BODY MASS INDEX: 30.12 KG/M2 | SYSTOLIC BLOOD PRESSURE: 134 MMHG | RESPIRATION RATE: 16 BRPM | TEMPERATURE: 98.5 F | HEIGHT: 63 IN | HEART RATE: 80 BPM

## 2019-08-05 DIAGNOSIS — G43.909 MIGRAINE WITHOUT STATUS MIGRAINOSUS, NOT INTRACTABLE, UNSPECIFIED MIGRAINE TYPE: ICD-10-CM

## 2019-08-05 DIAGNOSIS — G40.909 SEIZURE DISORDER (H): Primary | ICD-10-CM

## 2019-08-05 PROCEDURE — 99214 OFFICE O/P EST MOD 30 MIN: CPT | Performed by: FAMILY MEDICINE

## 2019-08-05 RX ORDER — LAMOTRIGINE 100 MG/1
100 TABLET ORAL DAILY
Qty: 30 TABLET | Refills: 0 | Status: SHIPPED | OUTPATIENT
Start: 2019-08-05

## 2019-08-05 RX ORDER — RIZATRIPTAN BENZOATE 10 MG/1
10 TABLET ORAL
Qty: 18 TABLET | Refills: 1 | Status: SHIPPED | OUTPATIENT
Start: 2019-08-05

## 2019-08-05 ASSESSMENT — MIFFLIN-ST. JEOR: SCORE: 1381.27

## 2019-08-05 NOTE — NURSING NOTE
"Initial /84   Pulse 80   Temp 98.5  F (36.9  C) (Tympanic)   Resp 16   Ht 1.594 m (5' 2.75\")   Wt 77.1 kg (170 lb)   LMP 07/15/2019 (Approximate)   SpO2 99%   BMI 30.35 kg/m   Estimated body mass index is 30.35 kg/m  as calculated from the following:    Height as of this encounter: 1.594 m (5' 2.75\").    Weight as of this encounter: 77.1 kg (170 lb). .        "

## 2019-08-05 NOTE — PROGRESS NOTES
Subjective     Romina Turner is a 44 year old female who presents to clinic today for the following health issues:    44 yr old female here for headaches. Has had headaches on and off for years.  She reports that the last 1 week headaches have been pretty severe.  She reports that the headaches are  throbbing pounding headaches.  Associated features include nausea, vomiting, sensitivity to light, stiff neck.  She denies dizziness or numbness in her arms and legs.  She took some ibuprofen for the headaches and she has been taking ibuprofen consistently since then.      She also reports that she was diagnosed with seizure disorder in 2015 and was started on Lamictal at the time.  This helped her seizures she has been seizure-free for about a year and a half.  Unfortunately her neurologist left the practice where she was and She will  like a referral to see her previous her neurologist so he can address the seizures and the headaches.  She denies any other acute symptoms today.    HPI   Headache  Onset: 6 days ago    Description:   Location: all over head pain   Character: throbbing pain, sharp pain, squeezing pain  Frequency:  unknown  Duration:  2 days    Intensity: severe    Progression of Symptoms:  improving    Accompanying Signs & Symptoms:  Stiff neck: YES  Neck or upper back pain: YES  Fever: YES- patient thinks so but was never measures  Sinus pressure: no  Nausea or vomiting: YES  Dizziness: no  Numbness: no  Weakness: no  Visual changes: YES- saw floaters    History:   Head trauma: no  Family history of migraines: no  Previous tests for headaches: no  Neurologist evaluations: YES- pt has seen for siezures  Able to do daily activities: no not with this episode  Wake with a headaches: YES  Do headaches wake you up: no  Daily pain medication use: no  Work/school stressors/changes: YES    Precipitating factors:   Does light make it worse: YES  Does sound make it worse: YES    Alleviating factors:  Does sleep  help: YES    Therapies Tried and outcome: Ibuprofen (Advil, Motrin)    Patient would like a referral to a neurologist.  She was on lamictal but stopped taking it 2 months ago.  She needs a refill.  She is concerned that this headache could be related to siezure disorder.    Patient Active Problem List   Diagnosis     CARDIOVASCULAR SCREENING; LDL GOAL LESS THAN 160     Depression with anxiety     History reviewed. No pertinent surgical history.    Social History     Tobacco Use     Smoking status: Never Smoker     Smokeless tobacco: Never Used   Substance Use Topics     Alcohol use: Yes     Comment: RARE     Family History   Problem Relation Age of Onset     Hypertension Mother      Eye Disorder Mother      Heart Disease Mother      Depression Mother      Hyperlipidemia Mother      Cancer Father         BLADER     Family History Negative No family hx of          Current Outpatient Medications   Medication Sig Dispense Refill     lamoTRIgine (LAMICTAL) 100 MG tablet Take 1 tablet (100 mg) by mouth daily 30 tablet 0     rizatriptan (MAXALT) 10 MG tablet Take 1 tablet (10 mg) by mouth at onset of headache for migraine May repeat in 2 hours. Max 3 tablets/24 hours. 18 tablet 1     escitalopram (LEXAPRO) 10 MG tablet Take 1 tablet (10 mg) by mouth daily (Patient not taking: Reported on 8/9/2017) 30 tablet 1     lamoTRIgine (LAMICTAL ODT) 100 MG TBDP Take 1 tablet (100 mg) by mouth daily 60 tablet      NO ACTIVE MEDICATIONS .       oxybutynin (DITROPAN XL) 5 MG 24 hr tablet Take 1 tablet (5 mg) by mouth daily (Patient not taking: Reported on 8/9/2017) 30 tablet 3     valACYclovir (VALTREX) 1000 mg tablet Take 1 tablet (1,000 mg) by mouth 3 times daily for 7 days 21 tablet 0     Allergies   Allergen Reactions     Penicillins Rash     Sulfa Drugs Rash     BP Readings from Last 3 Encounters:   08/05/19 134/84   12/01/17 113/75   11/10/17 113/74    Wt Readings from Last 3 Encounters:   08/05/19 77.1 kg (170 lb)   12/01/17  "73.9 kg (163 lb)   11/10/17 74.4 kg (164 lb)                    Reviewed and updated as needed this visit by Provider  Tobacco  Allergies  Meds  Problems  Med Hx  Surg Hx  Fam Hx         Review of Systems   ROS COMP: Constitutional, HEENT, cardiovascular, pulmonary, gi and gu systems are negative, except as otherwise noted.      Objective    /84   Pulse 80   Temp 98.5  F (36.9  C) (Tympanic)   Resp 16   Ht 1.594 m (5' 2.75\")   Wt 77.1 kg (170 lb)   LMP 07/15/2019 (Approximate)   SpO2 99%   BMI 30.35 kg/m    Body mass index is 30.35 kg/m .  Physical Exam   GENERAL: healthy, alert and no distress  EYES: Eyes grossly normal to inspection, PERRL and conjunctivae and sclerae normal  HENT: ear canals and TM's normal, nose and mouth without ulcers or lesions  NECK: no adenopathy, no asymmetry, masses, or scars and thyroid normal to palpation  RESP: lungs clear to auscultation - no rales, rhonchi or wheezes  CV: regular rate and rhythm, normal S1 S2, no S3 or S4, no murmur, click or rub, no peripheral edema and peripheral pulses strong  MS: no gross musculoskeletal defects noted, no edema    Diagnostic Test Results:  none         Assessment & Plan     1. Seizure disorder (H)  She needed a refill pending when she sees her neurologist. She reports that she stopped taking her medication since March of this year , she has been seizure free for almost two years.   - lamoTRIgine (LAMICTAL) 100 MG tablet; Take 1 tablet (100 mg) by mouth daily  Dispense: 30 tablet; Refill: 0  - NEUROLOGY ADULT REFERRAL    2. Migraine without status migrainosus, not intractable, unspecified migraine type  Medication faxed  - rizatriptan (MAXALT) 10 MG tablet; Take 1 tablet (10 mg) by mouth at onset of headache for migraine May repeat in 2 hours. Max 3 tablets/24 hours.  Dispense: 18 tablet; Refill: 1  - NEUROLOGY ADULT REFERRAL             FUTURE APPOINTMENTS:       - Follow-up visit in one month or sooner as needed.  There are " no Patient Instructions on file for this visit.    Return in about 1 month (around 9/5/2019) for Routine Visit.    He Duarte MD  [facility]

## 2019-08-14 PROBLEM — G40.909 SEIZURE DISORDER (H): Status: ACTIVE | Noted: 2019-08-14

## 2019-08-14 PROBLEM — G43.909 MIGRAINE WITHOUT STATUS MIGRAINOSUS, NOT INTRACTABLE, UNSPECIFIED MIGRAINE TYPE: Status: ACTIVE | Noted: 2019-08-14

## 2019-09-25 ENCOUNTER — MEDICAL CORRESPONDENCE (OUTPATIENT)
Dept: HEALTH INFORMATION MANAGEMENT | Facility: CLINIC | Age: 44
End: 2019-09-25

## 2019-10-16 ENCOUNTER — OFFICE VISIT (OUTPATIENT)
Dept: FAMILY MEDICINE | Facility: CLINIC | Age: 44
End: 2019-10-16
Payer: COMMERCIAL

## 2019-10-16 VITALS
WEIGHT: 167.4 LBS | DIASTOLIC BLOOD PRESSURE: 96 MMHG | TEMPERATURE: 98.2 F | BODY MASS INDEX: 29.89 KG/M2 | OXYGEN SATURATION: 98 % | HEART RATE: 96 BPM | SYSTOLIC BLOOD PRESSURE: 144 MMHG

## 2019-10-16 DIAGNOSIS — M25.50 MULTIPLE JOINT PAIN: Primary | ICD-10-CM

## 2019-10-16 DIAGNOSIS — M54.12 CERVICAL RADICULOPATHY: ICD-10-CM

## 2019-10-16 LAB
CRP SERPL-MCNC: 7.7 MG/L (ref 0–8)
ERYTHROCYTE [SEDIMENTATION RATE] IN BLOOD BY WESTERGREN METHOD: 9 MM/H (ref 0–20)

## 2019-10-16 PROCEDURE — 85652 RBC SED RATE AUTOMATED: CPT | Performed by: INTERNAL MEDICINE

## 2019-10-16 PROCEDURE — 99203 OFFICE O/P NEW LOW 30 MIN: CPT | Performed by: INTERNAL MEDICINE

## 2019-10-16 PROCEDURE — 86618 LYME DISEASE ANTIBODY: CPT | Performed by: INTERNAL MEDICINE

## 2019-10-16 PROCEDURE — 36415 COLL VENOUS BLD VENIPUNCTURE: CPT | Performed by: INTERNAL MEDICINE

## 2019-10-16 PROCEDURE — 86038 ANTINUCLEAR ANTIBODIES: CPT | Performed by: INTERNAL MEDICINE

## 2019-10-16 PROCEDURE — 86140 C-REACTIVE PROTEIN: CPT | Performed by: INTERNAL MEDICINE

## 2019-10-16 RX ORDER — GABAPENTIN 300 MG/1
CAPSULE ORAL
Qty: 90 CAPSULE | Refills: 1 | Status: SHIPPED | OUTPATIENT
Start: 2019-10-16 | End: 2020-10-28

## 2019-10-16 ASSESSMENT — PAIN SCALES - GENERAL: PAINLEVEL: MODERATE PAIN (5)

## 2019-10-16 NOTE — PROGRESS NOTES
Subjective     Romina Turner is a 44 year old female who presents to clinic today for the following health issues:  Chief Complaint   Patient presents with     Shoulder Pain     x 10 days, bilateral shoulder pain     Imm/Inj     flu vaccine deferred     HPI   Joint Pain    Onset: x 10 days.     Description:   Location: left shoulder, right shoulder and no known injury.  Pain is in the anterior shoulder and than radiates to the back of the upper arm and down the arms to the hands  Character: Sharp, Dull ache and throbbing, varies w/ movement    Intensity: severe    Progression of Symptoms: worse    Accompanying Signs & Symptoms: PATIENT reports not sleeping well due to the pain.    Other symptoms: radiation of pain to elbow and tingling in hands (hasn't noticed if this is certain fingers)  Having headaches, nausea, knee pain, fatigue.  No hip pain.  No neck pain.    No current joint swelling, had some in the knees before  No joint redness    History:   Previous similar pain: no       Precipitating factors:   Trauma or overuse: YES    Alleviating factors:  Improved by: nothing    Therapies Tried and outcome: massage, ice, Ibuprofen, Advil pm  Stopped topamax on 10/14/19 (history of seizures and headaches, this had recently been started and she was titrating it up) and symptoms did not improve.  Plan is to restart Lamictal for the seizures (wasn't working for headaches).   Brain MRI is scheduled for tomorrow to evaluate headache.    Dr. Pedro has supposedly sent over an order for Lyme disease to our lab      Patient Active Problem List   Diagnosis     CARDIOVASCULAR SCREENING; LDL GOAL LESS THAN 160     Depression with anxiety     Migraine without status migrainosus, not intractable, unspecified migraine type     Seizure disorder (H)     History reviewed. No pertinent surgical history.    Social History     Tobacco Use     Smoking status: Never Smoker     Smokeless tobacco: Never Used   Substance Use Topics      Alcohol use: Yes     Comment: RARE     Family History   Problem Relation Age of Onset     Hypertension Mother      Eye Disorder Mother      Heart Disease Mother      Depression Mother      Hyperlipidemia Mother      Cancer Father         BLADER     Family History Negative No family hx of          Current Outpatient Medications   Medication Sig Dispense Refill     gabapentin (NEURONTIN) 300 MG capsule Take one pill in the evening for 3 days, then increase to 1 pill twice per day for 3 days, then 1 pill three times per day. 90 capsule 1     lamoTRIgine (LAMICTAL ODT) 100 MG TBDP Take 1 tablet (100 mg) by mouth daily 60 tablet      lamoTRIgine (LAMICTAL) 100 MG tablet Take 1 tablet (100 mg) by mouth daily 30 tablet 0     NO ACTIVE MEDICATIONS .       oxybutynin (DITROPAN XL) 5 MG 24 hr tablet Take 1 tablet (5 mg) by mouth daily (Patient not taking: Reported on 8/9/2017) 30 tablet 3     rizatriptan (MAXALT) 10 MG tablet Take 1 tablet (10 mg) by mouth at onset of headache for migraine May repeat in 2 hours. Max 3 tablets/24 hours. 18 tablet 1     Allergies   Allergen Reactions     Penicillins Rash     Sulfa Drugs Rash         Reviewed and updated as needed this visit by Provider         Review of Systems   ROS COMP: Constitutional, MSK systems are negative, except as otherwise noted.      Objective    BP (!) 144/96 (BP Location: Right arm, Patient Position: Sitting, Cuff Size: Adult Regular)   Pulse 96   Temp 98.2  F (36.8  C) (Tympanic)   Wt 75.9 kg (167 lb 6.4 oz)   SpO2 98%   BMI 29.89 kg/m    Body mass index is 29.89 kg/m .  Physical Exam   GENERAL: healthy, alert and no distress  MS: pain to palpation of the anterior shoulder bilaterally.  Overhead ROM limited by pain, pain is elicited with external shoulder rotation, internal rotation, empty can test.    She reports pain in the upper arms and elbows to palpation that is non-focal.  Pain superior to the patellas and posterior knees as well   NEURO: Normal  strength and tone in arms and shoulder, normal light touch sensation in arms bilaterally, negative Tinel and Phalen    Diagnostic Test Results:  Labs reviewed in Epic  Results for orders placed or performed in visit on 10/16/19   Erythrocyte sedimentation rate auto   Result Value Ref Range    Sed Rate 9 0 - 20 mm/h   CRP inflammation   Result Value Ref Range    CRP Inflammation 7.7 0.0 - 8.0 mg/L   Lyme Disease Debbie with reflex to WB Serum   Result Value Ref Range    Lyme Disease Antibodies Serum 0.04 0.00 - 0.89           Assessment & Plan     1. Multiple joint pain  And  2. Cervical radiculopathy    Romina presents with 10 days of intense shoulder pain that radiates down the arms as well as knee pain bilaterally.  Exam is non-focal.  She has not had any injury and wouldn't really expect that to present bilaterally, so did not recommend x-rays.  No joint swelling/redness to suggest RA.  JENNY in process to screen for other autoimmune arthritis.  Negative ESR and CRP rule out PMR.  Given radicular symptoms and the fact that she already has a brain MRI scheduled tomorrow for headaches, recommended adding on cervical MRI.  This came back as normal.  Etiology of symptoms remains unclear.  Doing empiric trial of gabapentin to see if that helps and will follow-up on JENNY result.     - Erythrocyte sedimentation rate auto  - CRP inflammation  - Anti Nuclear Debbie IgG by IFA with Reflex  - Lyme Disease Debbie with reflex to WB Serum  - MR Cervical Spine w/o Contrast; Future  - gabapentin (NEURONTIN) 300 MG capsule; Take one pill in the evening for 3 days, then increase to 1 pill twice per day for 3 days, then 1 pill three times per day.  Dispense: 90 capsule; Refill: 1      Return in about 2 weeks (around 10/30/2019) for BP Recheck.    Mesfin Johnson MD  [facility]

## 2019-10-16 NOTE — LETTER
October 18, 2019      Romina Turner  [address] [place] [id number]        Dear ,    Your JENNY test is normal.  I am not sure what is causing your symptoms.  I would recommend continuing the gabapentin trial for at least a couple of weeks and then return so we can reassess and see if any more testing is needed.     Resulted Orders   Erythrocyte sedimentation rate auto   Result Value Ref Range    Sed Rate 9 0 - 20 mm/h   CRP inflammation   Result Value Ref Range    CRP Inflammation 7.7 0.0 - 8.0 mg/L   Anti Nuclear Debbie IgG by IFA with Reflex   Result Value Ref Range    JENNY interpretation Negative NEG^Negative      Comment:                                         Reference range:  <1:40  NEGATIVE  1:40 - 1:80  BORDERLINE POSITIVE  >1:80 POSITIVE     Lyme Disease Debbie with reflex to WB Serum   Result Value Ref Range    Lyme Disease Antibodies Serum 0.04 0.00 - 0.89      Comment:      Negative, Absence of detectable Borrelia burdorferi antibodies. A negative   result does not exclude the possibility of Borrelia burgdorferi infection. If   early Lyme disease is suspected, a second sample should be collected and   tested 2 to 4 weeks later.       If you have any questions or concerns, please call the clinic at the number listed above.     Sincerely,    Mesfin Johnson MD

## 2019-10-16 NOTE — PATIENT INSTRUCTIONS
I'll send you a MyChart message with results- if the inflammatory markers are high, you may have polymyalgia rheumatica.  This is treated with prednisone.  If these are normal, we'll try the gabapentin nerve pain medication.

## 2019-10-17 ENCOUNTER — HOSPITAL ENCOUNTER (OUTPATIENT)
Dept: MRI IMAGING | Facility: CLINIC | Age: 44
Discharge: HOME | End: 2019-10-17
Attending: PSYCHIATRY & NEUROLOGY | Admitting: PSYCHIATRY & NEUROLOGY
Payer: COMMERCIAL

## 2019-10-17 ENCOUNTER — HOSPITAL ENCOUNTER (OUTPATIENT)
Dept: MRI IMAGING | Facility: CLINIC | Age: 44
End: 2019-10-17
Attending: INTERNAL MEDICINE
Payer: COMMERCIAL

## 2019-10-17 DIAGNOSIS — G44.89 OTHER HEADACHE SYNDROME: ICD-10-CM

## 2019-10-17 DIAGNOSIS — M54.12 CERVICAL RADICULOPATHY: ICD-10-CM

## 2019-10-17 LAB
ANA SER QL IF: NEGATIVE
B BURGDOR IGG+IGM SER QL: 0.04 (ref 0–0.89)

## 2019-10-17 PROCEDURE — 70551 MRI BRAIN STEM W/O DYE: CPT

## 2019-10-17 PROCEDURE — 72141 MRI NECK SPINE W/O DYE: CPT

## 2019-11-04 ENCOUNTER — HEALTH MAINTENANCE LETTER (OUTPATIENT)
Age: 44
End: 2019-11-04

## 2020-02-16 ENCOUNTER — HEALTH MAINTENANCE LETTER (OUTPATIENT)
Age: 45
End: 2020-02-16

## 2020-10-12 ENCOUNTER — TRANSFERRED RECORDS (OUTPATIENT)
Dept: HEALTH INFORMATION MANAGEMENT | Facility: CLINIC | Age: 45
End: 2020-10-12

## 2020-10-28 ENCOUNTER — OFFICE VISIT (OUTPATIENT)
Dept: PODIATRY | Facility: CLINIC | Age: 45
End: 2020-10-28
Payer: COMMERCIAL

## 2020-10-28 VITALS
HEIGHT: 63 IN | SYSTOLIC BLOOD PRESSURE: 184 MMHG | BODY MASS INDEX: 29.59 KG/M2 | HEART RATE: 73 BPM | WEIGHT: 167 LBS | DIASTOLIC BLOOD PRESSURE: 107 MMHG

## 2020-10-28 DIAGNOSIS — M72.2 PLANTAR FASCIITIS, LEFT: Primary | ICD-10-CM

## 2020-10-28 PROCEDURE — 99203 OFFICE O/P NEW LOW 30 MIN: CPT | Performed by: PODIATRIST

## 2020-10-28 RX ORDER — MELOXICAM 7.5 MG/1
7.5 TABLET ORAL DAILY
Qty: 30 TABLET | Refills: 1 | Status: SHIPPED | OUTPATIENT
Start: 2020-10-28 | End: 2022-03-11

## 2020-10-28 ASSESSMENT — PAIN SCALES - GENERAL: PAINLEVEL: MILD PAIN (2)

## 2020-10-28 ASSESSMENT — MIFFLIN-ST. JEOR: SCORE: 1362.67

## 2020-10-28 NOTE — LETTER
10/28/2020         RE: Romina Turner  [address] [facility] [address]        Dear Colleague,    Thank you for referring your patient, Romina Turner, to the [facility]. Please see a copy of my visit note below.    PATIENT HISTORY:  Romina Turner is a 45 year old female who presents to clinic for a painful left foot .  The patient describes the pain as sharp stabbing.  The patient relates the pain level is moderate.  The patient relates pain with ambulation.  The patient relates pain is located on the bottom of the left heel.  The patient relates the pain has been present for the past several weeks to months.   The patient has tried different shoes with little relief.       REVIEW OF SYSTEMS:  Constitutional, HEENT, cardiovascular, pulmonary, GI, , musculoskeletal, neuro, skin, endocrine and psych systems are negative, except as otherwise noted.     PAST MEDICAL HISTORY: No past medical history on file.     PAST SURGICAL HISTORY: No past surgical history on file.     MEDICATIONS:   Current Outpatient Medications:      lamoTRIgine (LAMICTAL ODT) 100 MG TBDP, Take 1 tablet (100 mg) by mouth daily, Disp: 60 tablet, Rfl:      lamoTRIgine (LAMICTAL) 100 MG tablet, Take 1 tablet (100 mg) by mouth daily, Disp: 30 tablet, Rfl: 0     NO ACTIVE MEDICATIONS, ., Disp: , Rfl:      rizatriptan (MAXALT) 10 MG tablet, Take 1 tablet (10 mg) by mouth at onset of headache for migraine May repeat in 2 hours. Max 3 tablets/24 hours., Disp: 18 tablet, Rfl: 1     ALLERGIES:    Allergies   Allergen Reactions     Penicillins Rash     Sulfa Drugs Rash        SOCIAL HISTORY:   Social History     Socioeconomic History     Marital status:      Spouse name: Not on file     Number of children: Not on file     Years of education: Not on file     Highest education level: Not on file   Occupational History     Not on file   Social Needs     Financial resource strain: Not on file     Food insecurity      "Worry: Not on file     Inability: Not on file     Transportation needs     Medical: Not on file     Non-medical: Not on file   Tobacco Use     Smoking status: Never Smoker     Smokeless tobacco: Never Used   Substance and Sexual Activity     Alcohol use: Yes     Comment: RARE     Drug use: No     Sexual activity: Not on file   Lifestyle     Physical activity     Days per week: Not on file     Minutes per session: Not on file     Stress: Not on file   Relationships     Social connections     Talks on phone: Not on file     Gets together: Not on file     Attends Baptism service: Not on file     Active member of club or organization: Not on file     Attends meetings of clubs or organizations: Not on file     Relationship status: Not on file     Intimate partner violence     Fear of current or ex partner: Not on file     Emotionally abused: Not on file     Physically abused: Not on file     Forced sexual activity: Not on file   Other Topics Concern     Parent/sibling w/ CABG, MI or angioplasty before 65F 55M? Not Asked   Social History Narrative     Not on file        FAMILY HISTORY:   Family History   Problem Relation Age of Onset     Hypertension Mother      Eye Disorder Mother      Heart Disease Mother      Depression Mother      Hyperlipidemia Mother      Cancer Father         BLADER     Family History Negative No family hx of         EXAM:Vitals: BP (!) 184/107   Pulse 73   Ht 1.594 m (5' 2.75\")   Wt 75.8 kg (167 lb)   BMI 29.82 kg/m              General appearance: Patient is alert and fully cooperative with history & exam.  No sign of distress is noted during the visit.     Psychiatric: Affect is pleasant & appropriate.  Patient appears motivated to improve health.     Respiratory: Breathing is regular & unlabored while sitting.     HEENT: Hearing is intact to spoken word.  Speech is clear.  No gross evidence of visual impairment that would impact ambulation.     Dermatologic: Skin is intact to left lower " extremities without significant lesions, rash or abrasion.        Vascular: DP & PT pulses are intact & regular on the left.   CFT and skin temperature is normal to the left lower extremities.     Neurologic: Lower extremity sensation is intact to light touch.  No evidence of weakness or contracture in the left lower extremities.        Musculoskeletal: Patient is ambulatory without assistive device or brace.  No gross ankle deformity noted.  No foot or ankle joint effusion is noted.  Noted pain on palpation on the plantar aspect of the left heel at the insertion point of the plantar fascia.  No surrounding erythema noted.  Noted tight gastroc complex on the left lower extremity.         ASSESSMENT / PLAN:     ICD-10-CM    1. Plantar fasciitis, left  M72.2        I have explained to Romina  about the conditions.  We discussed the underlying contributing factors of the condition as well as the treatment plan and expected length of recovery.  At this time, the patient was instructed on icing, stretching, tissue massage and support.  The patient was fitted with a Dynaflex insert that will aid in offloading the tension forces to the soft tissues and prevent further inflammation.  To reduce the amount of current inflammation, the patient was prescribed Mobic 7.5 mg to be taken daily with food and instructed to stop taking if any stomach irritation or swelling in extremities are noted.  The patient will return in four weeks for reevaluation if the symptoms do not resolve.      Romina verbalized agreement with and understanding of the rational for the diagnosis and treatment plan.  All questions were answered to best of my ability and the patient's satisfaction. The patient was advised to contact the clinic with any questions that may arise after the clinic visit.      Disclaimer: This note consists of symbols derived from keyboarding, dictation and/or voice recognition software. As a result, there may be errors in the  script that have gone undetected. Please consider this when interpreting information found in this chart.       BRETT White D.P.M., F.[email or web address].[email or web address].S.          Again, thank you for allowing me to participate in the care of your patient.        Sincerely,        Ady White DPM

## 2020-10-28 NOTE — PROGRESS NOTES
PATIENT HISTORY:  Romina Turner is a 45 year old female who presents to clinic for a painful left foot .  The patient describes the pain as sharp stabbing.  The patient relates the pain level is moderate.  The patient relates pain with ambulation.  The patient relates pain is located on the bottom of the left heel.  The patient relates the pain has been present for the past several weeks to months.   The patient has tried different shoes with little relief.       REVIEW OF SYSTEMS:  Constitutional, HEENT, cardiovascular, pulmonary, GI, , musculoskeletal, neuro, skin, endocrine and psych systems are negative, except as otherwise noted.     PAST MEDICAL HISTORY: No past medical history on file.     PAST SURGICAL HISTORY: No past surgical history on file.     MEDICATIONS:   Current Outpatient Medications:      lamoTRIgine (LAMICTAL ODT) 100 MG TBDP, Take 1 tablet (100 mg) by mouth daily, Disp: 60 tablet, Rfl:      lamoTRIgine (LAMICTAL) 100 MG tablet, Take 1 tablet (100 mg) by mouth daily, Disp: 30 tablet, Rfl: 0     NO ACTIVE MEDICATIONS, ., Disp: , Rfl:      rizatriptan (MAXALT) 10 MG tablet, Take 1 tablet (10 mg) by mouth at onset of headache for migraine May repeat in 2 hours. Max 3 tablets/24 hours., Disp: 18 tablet, Rfl: 1     ALLERGIES:    Allergies   Allergen Reactions     Penicillins Rash     Sulfa Drugs Rash        SOCIAL HISTORY:   Social History     Socioeconomic History     Marital status:      Spouse name: Not on file     Number of children: Not on file     Years of education: Not on file     Highest education level: Not on file   Occupational History     Not on file   Social Needs     Financial resource strain: Not on file     Food insecurity     Worry: Not on file     Inability: Not on file     Transportation needs     Medical: Not on file     Non-medical: Not on file   Tobacco Use     Smoking status: Never Smoker     Smokeless tobacco: Never Used   Substance and Sexual Activity     Alcohol  "use: Yes     Comment: RARE     Drug use: No     Sexual activity: Not on file   Lifestyle     Physical activity     Days per week: Not on file     Minutes per session: Not on file     Stress: Not on file   Relationships     Social connections     Talks on phone: Not on file     Gets together: Not on file     Attends Shinto service: Not on file     Active member of club or organization: Not on file     Attends meetings of clubs or organizations: Not on file     Relationship status: Not on file     Intimate partner violence     Fear of current or ex partner: Not on file     Emotionally abused: Not on file     Physically abused: Not on file     Forced sexual activity: Not on file   Other Topics Concern     Parent/sibling w/ CABG, MI or angioplasty before 65F 55M? Not Asked   Social History Narrative     Not on file        FAMILY HISTORY:   Family History   Problem Relation Age of Onset     Hypertension Mother      Eye Disorder Mother      Heart Disease Mother      Depression Mother      Hyperlipidemia Mother      Cancer Father         BLADER     Family History Negative No family hx of         EXAM:Vitals: BP (!) 184/107   Pulse 73   Ht 1.594 m (5' 2.75\")   Wt 75.8 kg (167 lb)   BMI 29.82 kg/m              General appearance: Patient is alert and fully cooperative with history & exam.  No sign of distress is noted during the visit.     Psychiatric: Affect is pleasant & appropriate.  Patient appears motivated to improve health.     Respiratory: Breathing is regular & unlabored while sitting.     HEENT: Hearing is intact to spoken word.  Speech is clear.  No gross evidence of visual impairment that would impact ambulation.     Dermatologic: Skin is intact to left lower extremities without significant lesions, rash or abrasion.        Vascular: DP & PT pulses are intact & regular on the left.   CFT and skin temperature is normal to the left lower extremities.     Neurologic: Lower extremity sensation is intact to " light touch.  No evidence of weakness or contracture in the left lower extremities.        Musculoskeletal: Patient is ambulatory without assistive device or brace.  No gross ankle deformity noted.  No foot or ankle joint effusion is noted.  Noted pain on palpation on the plantar aspect of the left heel at the insertion point of the plantar fascia.  No surrounding erythema noted.  Noted tight gastroc complex on the left lower extremity.         ASSESSMENT / PLAN:     ICD-10-CM    1. Plantar fasciitis, left  M72.2        I have explained to Romina  about the conditions.  We discussed the underlying contributing factors of the condition as well as the treatment plan and expected length of recovery.  At this time, the patient was instructed on icing, stretching, tissue massage and support.  The patient was fitted with a Dynaflex insert that will aid in offloading the tension forces to the soft tissues and prevent further inflammation.  To reduce the amount of current inflammation, the patient was prescribed Mobic 7.5 mg to be taken daily with food and instructed to stop taking if any stomach irritation or swelling in extremities are noted.  The patient will return in four weeks for reevaluation if the symptoms do not resolve.      Romina verbalized agreement with and understanding of the rational for the diagnosis and treatment plan.  All questions were answered to best of my ability and the patient's satisfaction. The patient was advised to contact the clinic with any questions that may arise after the clinic visit.      Disclaimer: This note consists of symbols derived from keyboarding, dictation and/or voice recognition software. As a result, there may be errors in the script that have gone undetected. Please consider this when interpreting information found in this chart.       BRETT White D.P.M., [email or web address].[email or web address].S.

## 2020-10-28 NOTE — PATIENT INSTRUCTIONS
Romina to follow up with Primary Care provider regarding elevated blood pressure.      Initial musculoskeletal treatment recommendation:    1.  Wear supportive foot wear (stiff soles) and/or arch supports (rigid not cushion).  2.  Stretch the calf muscles as instructed once an hour.  3.  Massage the soft tissues around the injured area in the morning to loosen the tissue with an over the counter product like Icy Hot with Lidocaine  4.  Ice the injured area in the evening; 20 min on/off.  5. Take antiinflammatory medication as indicated.    If no improvement in symptoms within four to six weeks, return to clinic for reevaluation.

## 2020-10-28 NOTE — NURSING NOTE
"Chief Complaint   Patient presents with     Consult     left heel pain       Initial BP (!) 184/107   Pulse 73   Ht 1.594 m (5' 2.75\")   Wt 75.8 kg (167 lb)   BMI 29.82 kg/m   Estimated body mass index is 29.82 kg/m  as calculated from the following:    Height as of this encounter: 1.594 m (5' 2.75\").    Weight as of this encounter: 75.8 kg (167 lb).  Medications and allergies reviewed.      Rani LEAHY MA    "

## 2020-11-22 ENCOUNTER — HEALTH MAINTENANCE LETTER (OUTPATIENT)
Age: 45
End: 2020-11-22

## 2021-02-13 ENCOUNTER — HEALTH MAINTENANCE LETTER (OUTPATIENT)
Age: 46
End: 2021-02-13

## 2021-09-19 ENCOUNTER — HEALTH MAINTENANCE LETTER (OUTPATIENT)
Age: 46
End: 2021-09-19

## 2022-01-08 ENCOUNTER — HEALTH MAINTENANCE LETTER (OUTPATIENT)
Age: 47
End: 2022-01-08

## 2022-01-11 ENCOUNTER — ANCILLARY PROCEDURE (OUTPATIENT)
Dept: GENERAL RADIOLOGY | Facility: CLINIC | Age: 47
End: 2022-01-11
Attending: STUDENT IN AN ORGANIZED HEALTH CARE EDUCATION/TRAINING PROGRAM
Payer: COMMERCIAL

## 2022-01-11 ENCOUNTER — OFFICE VISIT (OUTPATIENT)
Dept: FAMILY MEDICINE | Facility: CLINIC | Age: 47
End: 2022-01-11
Payer: COMMERCIAL

## 2022-01-11 VITALS
WEIGHT: 171.4 LBS | SYSTOLIC BLOOD PRESSURE: 152 MMHG | HEART RATE: 80 BPM | DIASTOLIC BLOOD PRESSURE: 98 MMHG | OXYGEN SATURATION: 98 % | BODY MASS INDEX: 30.6 KG/M2

## 2022-01-11 DIAGNOSIS — M25.571 PAIN IN JOINT, ANKLE AND FOOT, RIGHT: ICD-10-CM

## 2022-01-11 DIAGNOSIS — M25.571 PAIN IN JOINT, ANKLE AND FOOT, RIGHT: Primary | ICD-10-CM
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
## 2022-01-11 DIAGNOSIS — M25.471 RIGHT ANKLE SWELLING: ICD-10-CM

## 2022-01-11 DIAGNOSIS — S82.61XA CLOSED FRACTURE OF DISTAL LATERAL MALLEOLUS OF RIGHT FIBULA, INITIAL ENCOUNTER: Primary | ICD-10-CM

## 2022-01-11 PROCEDURE — 99214 OFFICE O/P EST MOD 30 MIN: CPT | Performed by: STUDENT IN AN ORGANIZED HEALTH CARE EDUCATION/TRAINING PROGRAM

## 2022-01-11 PROCEDURE — 73610 X-RAY EXAM OF ANKLE: CPT | Mod: 26 | Performed by: RADIOLOGY

## 2022-01-11 NOTE — PROGRESS NOTES
Assessment and Plan     46-year-old female with fall approximately 2 weeks ago presenting with continued pain and swelling in the lateral aspect of her right ankle.  On exam she has bony tenderness over lateral malleolus and notable swelling.  I recommended an x-ray today to rule out fracture.  We will decide next steps once we see these results later today.    1. Pain in joint, ankle and foot, right  - XR Ankle Right G/E 3 Views; Future    2. Right ankle swelling  - XR Ankle Right G/E 3 Views; Future    3. Fall, initial encounter  - XR Ankle Right G/E 3 Views; Future    Follow up: Pending imaging  Options for treatment and follow-up care were reviewed with the patient and/or guardian. Romina Turner and/or guardian engaged in the decision making process and verbalized understanding of the options discussed and agreed with the final plan.    Dr. Gregroy Sen         HPI:   Romina Turner is a 46 year old  female who presents for:    Chief Complaint   Patient presents with     Musculoskeletal Problem     sprained ankle. 12-26-21. doesn't seem to be healing well, but it gets sore and swollen. didn't ever bruise.        HPI: Patient tells me she hurt her right ankle on 12/26/21 by slipping out in the snow. Cannot remember which way her ankle rolled but possibly in. Couldn't walk on the ankle after. Did swell up after and is still swollen. Seems to be a little better now in terms of pain but still significant swelling.   Right Foot/Ankle pain:   Location: riht ankle   Duration:  2 weeks  Trauma/ Fall? Yes   Able to walk? No  Swelling? Yes   Bruising? no  Numbness/ Tingling? None   Weakness? Yes   Instability? [place]il[place]  Snapping/Clicking? None  Imaging? None   Treatment? Wrapping and icing, ibuprofen.          PMHX:     Patient Active Problem List   Diagnosis     CARDIOVASCULAR SCREENING; LDL GOAL LESS THAN 160     Depression with anxiety     Migraine without status migrainosus, not intractable, unspecified migraine type      Seizure disorder (H)     Encounter for screening for cardiovascular disorders       Current Outpatient Medications   Medication Sig Dispense Refill     lamoTRIgine (LAMICTAL) 100 MG tablet Take 1 tablet (100 mg) by mouth daily 30 tablet 0     rizatriptan (MAXALT) 10 MG tablet Take 1 tablet (10 mg) by mouth at onset of headache for migraine May repeat in 2 hours. Max 3 tablets/24 hours. 18 tablet 1     lamoTRIgine (LAMICTAL ODT) 100 MG TBDP Take 1 tablet (100 mg) by mouth daily 60 tablet      meloxicam (MOBIC) 7.5 MG tablet Take 1 tablet (7.5 mg) by mouth daily (Patient not taking: Reported on 1/11/2022) 30 tablet 1     NO ACTIVE MEDICATIONS .         Social History     Tobacco Use     Smoking status: Never Smoker     Smokeless tobacco: Never Used   Substance Use Topics     Alcohol use: Yes     Comment: RARE     Drug use: No       Social History     Social History Narrative     Not on file       Allergies   Allergen Reactions     Pcn [Penicillins] Rash     Sulfa Drugs Rash       No results found for this or any previous visit (from the past 24 hour(s)).         Review of Systems:    ROS: 10 point ROS neg other than the symptoms noted above in the HPI.         Physical Exam:     Vitals:    01/11/22 1436   BP: (!) 152/98   BP Location: Left arm   Patient Position: Sitting   Cuff Size: Adult Regular   Pulse: 80   SpO2: 98%   Weight: 77.7 kg (171 lb 6.4 oz)     Body mass index is 30.6 kg/m .    General appearance: Alert, cooperative, no distress, appears stated age  Head: Normocephalic, atraumatic, without obvious abnormality  Eyes: Pupils equal round, reactive.  Conjunctiva clear.  Nose: Nares normal, no drainage.  Throat: Lips, mucosa, tongue normal mucosa pink and moist  Neck: Supple, symmetric, trachea midline, no adenopathy.  No thyroid enlargement, tenderness or nodules.      Right Foot/Ankle:   Inspection: Swelling - YEs; Bruising - no   Sensation: intact in peroneal, tibial, sural, and saphenous distribution    ROM: Dorsiflexion - normal; Plantarflexion - normal; Inversion -normal; Eversion - normal;    Strength: 5/5 in all motions; Pain w/ resisted extension  Bony tenderness: Medial malleolus? - no; Lateral malleolus? - yes; Navicular? - no; 5th Metatarsal? - no; Other - none  Ligaments Tenderness: ATFL/CFL -mild yes; Deltoid - no; Syndesmosis - no;  Tendons: Posterior Tibialis - no; Peroneals - no; Achilles - no   Maneuvers: Anterior Drawer - normal

## 2022-01-12 NOTE — RESULT ENCOUNTER NOTE
I called and spoke with the patient about their recent clinic visit results. I answered any questions they had. Given stability and signs of fracture I recommended a walking boot over the next couple of weeks with her follow-up with me to see if we can remove this then. I also recommended physical therapy referral to maintain mobility and strength as she continues to heal.      Dr. Gregory Sen

## 2022-01-21 ENCOUNTER — OFFICE VISIT (OUTPATIENT)
Dept: FAMILY MEDICINE | Facility: CLINIC | Age: 47
End: 2022-01-21
Payer: COMMERCIAL

## 2022-01-21 VITALS
HEART RATE: 76 BPM | BODY MASS INDEX: 30.69 KG/M2 | TEMPERATURE: 98.4 F | HEIGHT: 63 IN | OXYGEN SATURATION: 99 % | RESPIRATION RATE: 16 BRPM | WEIGHT: 173.2 LBS | SYSTOLIC BLOOD PRESSURE: 142 MMHG | DIASTOLIC BLOOD PRESSURE: 108 MMHG

## 2022-01-21 DIAGNOSIS — L29.9 ITCHING: ICD-10-CM

## 2022-01-21 DIAGNOSIS — R21 RASH AND NONSPECIFIC SKIN ERUPTION: Primary | ICD-10-CM

## 2022-01-21 DIAGNOSIS — I10 BENIGN ESSENTIAL HYPERTENSION: ICD-10-CM

## 2022-01-21 DIAGNOSIS — L85.3 DRY SKIN: ICD-10-CM

## 2022-01-21 LAB
ANION GAP SERPL CALCULATED.3IONS-SCNC: 5 MMOL/L (ref 3–14)
BUN SERPL-MCNC: 10 MG/DL (ref 7–30)
CALCIUM SERPL-MCNC: 8.6 MG/DL (ref 8.5–10.1)
CHLORIDE BLD-SCNC: 106 MMOL/L (ref 94–109)
CO2 SERPL-SCNC: 27 MMOL/L (ref 20–32)
CREAT SERPL-MCNC: 0.66 MG/DL (ref 0.52–1.04)
GFR SERPL CREATININE-BSD FRML MDRD: >90 ML/MIN/1.73M2
GLUCOSE BLD-MCNC: 90 MG/DL (ref 70–99)
POTASSIUM BLD-SCNC: 3.4 MMOL/L (ref 3.4–5.3)
SODIUM SERPL-SCNC: 138 MMOL/L (ref 133–144)

## 2022-01-21 PROCEDURE — 80048 BASIC METABOLIC PNL TOTAL CA: CPT | Performed by: NURSE PRACTITIONER

## 2022-01-21 PROCEDURE — 99213 OFFICE O/P EST LOW 20 MIN: CPT | Performed by: NURSE PRACTITIONER

## 2022-01-21 PROCEDURE — 36415 COLL VENOUS BLD VENIPUNCTURE: CPT | Performed by: NURSE PRACTITIONER

## 2022-01-21 RX ORDER — HYDROCHLOROTHIAZIDE 25 MG/1
25 TABLET ORAL DAILY
Qty: 30 TABLET | Refills: 0 | Status: SHIPPED | OUTPATIENT
Start: 2022-01-21 | End: 2022-02-18 | Stop reason: SINTOL

## 2022-01-21 RX ORDER — LISINOPRIL 10 MG/1
10 TABLET ORAL DAILY
Qty: 30 TABLET | Refills: 0 | Status: SHIPPED | OUTPATIENT
Start: 2022-01-21 | End: 2022-02-18

## 2022-01-21 ASSESSMENT — MIFFLIN-ST. JEOR: SCORE: 1385.88

## 2022-01-21 ASSESSMENT — PAIN SCALES - GENERAL: PAINLEVEL: NO PAIN (0)

## 2022-01-21 NOTE — PROGRESS NOTES
Assessment & Plan     Rash and nonspecific skin eruption  Acute, diffuse, red itchy bumps on both lower legs without notable infection. Symptoms consistent with dry itchy skin versus the start of folliculitis. Discussed treatment for dry skin with patient as below and if not improving in 5 days to notify provider for further recommendations.     Dry skin  Symptoms consistent with possible dry skin, recommend good skin care as below as well as starting an over the counter anti-histamine such as Claritin or zyrtec daily. If not improved in 5 days notify provider.   PROPER SKIN CARE REGIMEN:    Eliminate harsh soaps, i.e. Dial, Zest, Kiswahili Spring;    Use mild soaps such as Cetaphil or Dove Sensitive Skin    Avoid hot or cold showers    After showering, lightly dry off.     Aggressive use of a moisturizer creams within ten minutes of bathing (including Vanicream, Cetaphil, Aquafor or Cerave)     We recommend using a tub that needs to be scooped out, not a pump. This has more of an oil base. It will hold moisture in your skin much better than a water base moisturizer. The ones recommended are non- pore clogging.    No use of fragrance moisturizers  Bleach baths:  We recommend a bleach bath once per week.  With eczema or dry skin, the skin barrier is impaired, making the skin more prone to infections. Doing a bleach bath once per week is safe and helps keep skin from becoming infected. --Put 1/2c. of bleach into a full tub of water. Baths only need to be 3-5 minutes.    Itching  Itching as above.     Benign essential hypertension  New diagnosis, family history of hypertension. Will start on dual therapy with lab work. Recheck 2 weeks with a nurse only visit for blood pressure and lab.   - Basic metabolic panel  (Ca, Cl, CO2, Creat, Gluc, K, Na, BUN); Future  - lisinopril (ZESTRIL) 10 MG tablet; Take 1 tablet (10 mg) by mouth daily  - hydrochlorothiazide (HYDRODIURIL) 25 MG tablet; Take 1 tablet (25 mg) by mouth daily  "             BMI:   Estimated body mass index is 30.92 kg/m  as calculated from the following:    Height as of this encounter: 1.594 m (5' 2.76\").    Weight as of this encounter: 78.6 kg (173 lb 3.2 oz).   Weight management plan: Discussed healthy diet and exercise guidelines    See Patient Instructions    Return in about 2 weeks (around 2/4/2022), or if symptoms worsen or fail to improve, for BP Recheck, Lab Work.    Allyn Carballo, DNP, APRN-CNP   [facility]    Sparkle Vanegas is a 46 year old who presents for the following health issues:    HPI     Rash  Onset/Duration: 4 days ago  Description  Location: both legs  Character: raised, painful  Itching: moderate  Intensity:  moderate  Progression of Symptoms:  same  Accompanying signs and symptoms:   Fever: no  Body aches or joint pain: no  Sore throat symptoms: no  Recent cold symptoms: no  History:           Previous episodes of similar rash: None  New exposures:  None  Recent travel: no  Exposure to similar rash: no    Therapies tried and outcome: ibuprofen       Mostly at night       Review of Systems   Constitutional, HEENT, cardiovascular, pulmonary, gi and gu systems are negative, except as otherwise noted.      Objective    BP (!) 142/108   Pulse 76   Temp 98.4  F (36.9  C) (Tympanic)   Resp 16   Ht 1.594 m (5' 2.76\")   Wt 78.6 kg (173 lb 3.2 oz)   LMP 01/07/2022   SpO2 99%   BMI 30.92 kg/m    Body mass index is 30.92 kg/m .  Physical Exam   GENERAL APPEARANCE: healthy, alert and no distress  RESP: lungs clear to auscultation - no rales, rhonchi or wheezes  CV: regular rates and rhythm, normal S1 S2, no S3 or S4 and no murmur, click or rub  ABDOMEN: soft, nontender, without hepatosplenomegaly or masses and bowel sounds normal  MS: extremities normal- no gross deformities noted and generalized bilateral lower extremity swelling   SKIN: diffuse, scattered papular rash mostly flesh colored, some erythematous without " drainage on bilateral lower extremities   NEURO: Normal strength and tone, mentation intact and speech normal  PSYCH: mentation appears normal and affect normal/bright    Results for orders placed or performed in visit on 01/21/22   Basic metabolic panel  (Ca, Cl, CO2, Creat, Gluc, K, Na, BUN)     Status: Normal   Result Value Ref Range    Sodium 138 133 - 144 mmol/L    Potassium 3.4 3.4 - 5.3 mmol/L    Chloride 106 94 - 109 mmol/L    Carbon Dioxide (CO2) 27 20 - 32 mmol/L    Anion Gap 5 3 - 14 mmol/L    Urea Nitrogen 10 7 - 30 mg/dL    Creatinine 0.66 0.52 - 1.04 mg/dL    Calcium 8.6 8.5 - 10.1 mg/dL    Glucose 90 70 - 99 mg/dL    GFR Estimate >90 >60 mL/min/1.73m2           Chart documentation with Dragon Voice recognition Software. Although reviewed after completion, some words and grammatical errors may remain.

## 2022-01-21 NOTE — PATIENT INSTRUCTIONS
Rash and nonspecific skin eruption  Acute, diffuse, red itchy bumps on both lower legs without notable infection. Symptoms consistent with dry itchy skin versus the start of folliculitis. Discussed treatment for dry skin with patient as below and if not improving in 5 days to notify provider for further recommendations.     Dry skin  Symptoms consistent with possible dry skin, recommend good skin care as below as well as starting an over the counter anti-histamine such as Claritin or zyrtec daily. If not improved in 5 days notify provider.   PROPER SKIN CARE REGIMEN:    Eliminate harsh soaps, i.e. Dial, Zest, English [place];    Use mild soaps such as Cetaphil or Dove Sensitive Skin    Avoid hot or cold showers    After showering, lightly dry off.     Aggressive use of a moisturizer creams within ten minutes of bathing (including Vanicream, Cetaphil, Aquafor or Cerave)     We recommend using a tub that needs to be scooped out, not a pump. This has more of an oil base. It will hold moisture in your skin much better than a water base moisturizer. The ones recommended are non- pore clogging.    No use of fragrance moisturizers  Bleach baths:  We recommend a bleach bath once per week.  With eczema or dry skin, the skin barrier is impaired, making the skin more prone to infections. Doing a bleach bath once per week is safe and helps keep skin from becoming infected. --Put 1/2c. of bleach into a full tub of water. Baths only need to be 3-5 minutes.    Itching  Itching as above.     Benign essential hypertension  New diagnosis, family history of hypertension. Will start on dual therapy with lab work. Recheck 2 weeks with a nurse only visit for blood pressure and lab.   - Basic metabolic panel  (Ca, Cl, CO2, Creat, Gluc, K, Na, BUN); Future  - lisinopril (ZESTRIL) 10 MG tablet; Take 1 tablet (10 mg) by mouth daily  - hydrochlorothiazide (HYDRODIURIL) 25 MG tablet; Take 1 tablet (25 mg) by mouth daily

## 2022-02-04 ENCOUNTER — ALLIED HEALTH/NURSE VISIT (OUTPATIENT)
Dept: FAMILY MEDICINE | Facility: CLINIC | Age: 47
End: 2022-02-04
Payer: COMMERCIAL

## 2022-02-04 ENCOUNTER — TELEPHONE (OUTPATIENT)
Dept: FAMILY MEDICINE | Facility: CLINIC | Age: 47
End: 2022-02-04

## 2022-02-04 VITALS — SYSTOLIC BLOOD PRESSURE: 108 MMHG | DIASTOLIC BLOOD PRESSURE: 84 MMHG | HEART RATE: 72 BPM

## 2022-02-04 DIAGNOSIS — I10 BENIGN ESSENTIAL HYPERTENSION: Primary | ICD-10-CM

## 2022-02-04 PROCEDURE — 99207 PR NO CHARGE NURSE ONLY: CPT

## 2022-02-04 NOTE — PROGRESS NOTES
"Romina Turner is a 46 year old year old patient who comes in today for a Blood Pressure check because of new medication, she takes both every night at 10 PM.   Patient is taking medication as prescribed  Patient is tolerating medications well.  Patient is not monitoring Blood Pressure at home  Current complaints: cold, white,tingling and numb fingers and toes started approximately 1 week after starting hydrochlorothiazide and lisinopril. Happens approximately 45 minutes every night from 5PM unitl 10 PM until she warms up. Feels \"a little off\" in the mornings.\"a little empty\".   Disposition:  patient instructed to Try splitting up meds taking hydrochlorothiazide in AM and lisinopril night. Recheck with RN for BP check only again in 1 week.  Jessica BRADLEY RN      "

## 2022-02-04 NOTE — TELEPHONE ENCOUNTER
Pt calls & states she had OV 1/21/22 & was started on BP meds, lisinopril & hydrochlorothiazide. Pt started taking these meds 1/25. Was also seen for rash on legs & states this has resolved.  States Monday 1/31, noticed right pointer finger turned white & was tingly & slight numb.   Yesterday, 2/5, noticed 3 toes on left foot turned white & were tingly & slight numb.  Pt states this comes & goes, when cold, & goes away within 45 minutes once she is warm.   States this only happens in the evening & before she takes her BP meds.   States this has never happened before & wondering   if this could be from the new BP meds.  Pt coming in for BP recheck at 4 today.  Will route to provider for review.    Bekah Cheney RN

## 2022-02-04 NOTE — TELEPHONE ENCOUNTER
The symptoms reported would less likely be related to the blood pressure medications, however we may need to adjust medications slightly.  Blood pressure looks significantly improved.  Would recommend splitting the hydrochlorothiazide in half (12.5 mg) and have patient report how symptoms are doing in approximately 1 week with a repeat blood pressure.    Thanks,  Allyn Carballo, MANUEL, APRN-CNP

## 2022-02-07 ENCOUNTER — TELEPHONE (OUTPATIENT)
Dept: FAMILY MEDICINE | Facility: CLINIC | Age: 47
End: 2022-02-07
Payer: COMMERCIAL

## 2022-02-14 ENCOUNTER — ANCILLARY PROCEDURE (OUTPATIENT)
Dept: GENERAL RADIOLOGY | Facility: CLINIC | Age: 47
End: 2022-02-14
Attending: FAMILY MEDICINE
Payer: COMMERCIAL

## 2022-02-14 ENCOUNTER — OFFICE VISIT (OUTPATIENT)
Dept: ORTHOPEDICS | Facility: CLINIC | Age: 47
End: 2022-02-14
Payer: COMMERCIAL

## 2022-02-14 VITALS
BODY MASS INDEX: 30.65 KG/M2 | HEIGHT: 63 IN | SYSTOLIC BLOOD PRESSURE: 122 MMHG | WEIGHT: 173 LBS | DIASTOLIC BLOOD PRESSURE: 76 MMHG

## 2022-02-14 DIAGNOSIS — S99.911A RIGHT ANKLE INJURY: ICD-10-CM

## 2022-02-14 DIAGNOSIS — S99.911A INJURY OF RIGHT ANKLE, INITIAL ENCOUNTER: Primary | ICD-10-CM

## 2022-02-14 PROCEDURE — 73610 X-RAY EXAM OF ANKLE: CPT | Mod: RT | Performed by: RADIOLOGY

## 2022-02-14 PROCEDURE — 99203 OFFICE O/P NEW LOW 30 MIN: CPT | Performed by: FAMILY MEDICINE

## 2022-02-14 ASSESSMENT — MIFFLIN-ST. JEOR: SCORE: 1385.04

## 2022-02-14 NOTE — PATIENT INSTRUCTIONS
# Right Ankle Injury: Symptoms noted after injury on 12/26/2021 after a slip on some snow.  She does have persisting right lateral and posterior ankle pain with some tenderness to palpation but intact range of motion and strength.  Repeat x-rays today showing possibly residual avulsion fracture.  She likely has a combination of ankle sprain/avulsion fracture.  Given she is able to walk on her toes and hop without significant discomfort we will start rehab and follow-up if not improving.  Image Findings: Possible distal fibular avulsion fracture  Treatment: Activities as tolerated, home exercises given today, referral to physical therapy as well  Job: Letter written for work  Medications/Injections: Limited tylenol/ibuprofen for pain for 1-2 weeks, none  Follow-up: In one month if symptoms do not improve, sooner if worsening  Can consider further imaging to evaluate cause of pain if symptoms persisting    Please call [phone]   Ask for my team if you have any questions or concerns    If you have not yet received the influenza vaccine but would like to get one, please call  [phone] or you can schedule via Snapkin    It was great seeing you today!    Lisandro Parra MD, CAQSM     Continue ankle brace 1-2 weeks, start home exercises first with range of motion (draw capital alphabet with great toe)  Home exercises:      Proprioception training: Stand on bad leg and make small circles with good leg while brushing teeth (twice daily)

## 2022-02-14 NOTE — LETTER
2/14/2022         RE: Romina Turner  [address] [facility] [facility] [place] [address]        Dear Colleague,    Thank you for referring your patient, Romina Turner, to the [facility]. Please see a copy of my visit note below.    ASSESSMENT & PLAN    Romina was seen today for pain.    Diagnoses and all orders for this visit:    Injury of right ankle, initial encounter  -     XR Ankle RT G/E 3 vw; Future  -     Physical Therapy Referral; Future      This issue is acute and Unchanged.    # Right Ankle Injury: Symptoms noted after injury on 12/26/2021 after a slip on some snow.  She does have persisting right lateral and posterior ankle pain with some tenderness to palpation but intact range of motion and strength.  Repeat x-rays today showing possibly residual avulsion fracture.  She likely has a combination of ankle sprain/avulsion fracture.  Given she is able to walk on her toes and hop without significant discomfort we will start rehab and follow-up if not improving.  Image Findings: Possible distal fibular avulsion fracture  Treatment: Activities as tolerated, home exercises given today, referral to physical therapy as well  Job: Letter written for work  Medications/Injections: Limited tylenol/ibuprofen for pain for 1-2 weeks, none  Follow-up: In one month if symptoms do not improve, sooner if worsening  Can consider further imaging to evaluate cause of pain if symptoms persisting    Lisandro Parra MD  [facility]    -----  Chief Complaint   Patient presents with     Right Ankle - Pain       SUBJECTIVE  Romina Turner is a/an 46 year old female who is seen as a self referral for evaluation of right ankle pain.     The patient is seen by themselves.      Onset: 12/26/21, 7 week(s) ago. Patient describes injury as slipped on snow and twisted ankle.  Saw PCP 1/11/22 and xrays were performed.   Location of Pain: right lateral and posterior  "ankle  Worsened by: walking   Better with: ibuprofen  Treatments tried:  compression, ice, ibuprofen, ankle brace   Associated symptoms: no distal numbness or tingling; denies swelling or warmth    Orthopedic/Surgical history: NO  Social History/Occupation: HR     No family history pertinent to patient's problem today.      REVIEW OF SYSTEMS:  Review of Systems  Constitutional, HEENT, cardiovascular, pulmonary, gi and gu systems are negative, except as otherwise noted.    OBJECTIVE:  /76   Ht 1.594 m (5' 2.76\")   Wt 78.5 kg (173 lb)   BMI 30.88 kg/m     General: healthy, alert and in no distress  HEENT: no scleral icterus or conjunctival erythema  Skin: no suspicious lesions or rash. No jaundice.  CV: distal perfusion intact    Resp: normal respiratory effort without conversational dyspnea   Psych: normal mood and affect  Gait: mild limp on right leg  Neuro: Normal light sensory exam of right lower extremity     RIGHT ANKLE  Inspection:    No swelling, redness, edema or ecchymosis is observed  Palpation:    Tender about the ATFL, CFL and distal to lateral malleolus. Remainder of bony and ligamentous landmarks are nontender.  Range of Motion:     Plantarflexion full / dorsiflexion limited by tightness / inversion limited slightly by pain / eversion full  Strength:    full  Special Tests:    negative anterior drawer, positive talar tilt, negative valgus stress, negative forced external rotation/eversion, negative Howell sign, negative squeeze test. Able to perform heel raise and Able to hop.        RADIOLOGY:  I independently ordered, visualized and reviewed these images with the patient  Possible distal fibula small avulsion fracture      Narrative & Impression   EXAM: XR ANKLE RIGHT G/E 3 VIEWS  LOCATION: [facility]  DATE/TIME: 1/11/2022 2:55 PM     INDICATION:  Pain in joint, ankle and foot, right, Right ankle swelling, Fall, initial encounter.  COMPARISON: None.                     "                                                  IMPRESSION: There is a tiny minimally displaced fracture of the distal tip of the lateral malleolus. This may be acute or subacute since there is soft tissue swelling about the lateral malleolus. Otherwise negative.         Review of external notes as documented elsewhere in note  Review of the result(s) of each unique test - right ankle x-ray            Again, thank you for allowing me to participate in the care of your patient.        Sincerely,        Lisandro Parra MD

## 2022-02-14 NOTE — LETTER
[facility] [place]  [address]  [address]  [place] [address]  [phone]          February 14, 2022    RE:  Romina Turner                                                                                                                                                       [address] [place] [place] [address]          To whom it may concern:    Romina Turner is under my professional care for Injury of right ankle, initial encounter.  She was seen today 2/14/2022 for this.        Sincerely,        Lisandro Parra MD

## 2022-02-14 NOTE — PROGRESS NOTES
ASSESSMENT & PLAN    Romina was seen today for pain.    Diagnoses and all orders for this visit:    Injury of right ankle, initial encounter  -     XR Ankle RT G/E 3 vw; Future  -     Physical Therapy Referral; Future      This issue is acute and Unchanged.    # Right Ankle Injury: Symptoms noted after injury on 12/26/2021 after a slip on some snow.  She does have persisting right lateral and posterior ankle pain with some tenderness to palpation but intact range of motion and strength.  Repeat x-rays today showing possibly residual avulsion fracture.  She likely has a combination of ankle sprain/avulsion fracture.  Given she is able to walk on her toes and hop without significant discomfort we will start rehab and follow-up if not improving.  Image Findings: Possible distal fibular avulsion fracture  Treatment: Activities as tolerated, home exercises given today, referral to physical therapy as well  Job: Letter written for work  Medications/Injections: Limited tylenol/ibuprofen for pain for 1-2 weeks, none  Follow-up: In one month if symptoms do not improve, sooner if worsening  Can consider further imaging to evaluate cause of pain if symptoms persisting    Lisandro Parra MD  [facility] [place]    -----  Chief Complaint   Patient presents with     Right Ankle - Pain       SUBJECTIVE  Romina Turner is a/an 46 year old female who is seen as a self referral for evaluation of right ankle pain.     The patient is seen by themselves.      Onset: 12/26/21, 7 week(s) ago. Patient describes injury as slipped on snow and twisted ankle.  Saw PCP 1/11/22 and xrays were performed.   Location of Pain: right lateral and posterior ankle  Worsened by: walking   Better with: ibuprofen  Treatments tried:  compression, ice, ibuprofen, ankle brace   Associated symptoms: no distal numbness or tingling; denies swelling or warmth    Orthopedic/Surgical history: NO  Social History/Occupation: HR     No  "family history pertinent to patient's problem today.      REVIEW OF SYSTEMS:  Review of Systems  Constitutional, HEENT, cardiovascular, pulmonary, gi and gu systems are negative, except as otherwise noted.    OBJECTIVE:  /76   Ht 1.594 m (5' 2.76\")   Wt 78.5 kg (173 lb)   BMI 30.88 kg/m     General: healthy, alert and in no distress  HEENT: no scleral icterus or conjunctival erythema  Skin: no suspicious lesions or rash. No jaundice.  CV: distal perfusion intact    Resp: normal respiratory effort without conversational dyspnea   Psych: normal mood and affect  Gait: mild limp on right leg  Neuro: Normal light sensory exam of right lower extremity     RIGHT ANKLE  Inspection:    No swelling, redness, edema or ecchymosis is observed  Palpation:    Tender about the ATFL, CFL and distal to lateral malleolus. Remainder of bony and ligamentous landmarks are nontender.  Range of Motion:     Plantarflexion full / dorsiflexion limited by tightness / inversion limited slightly by pain / eversion full  Strength:    full  Special Tests:    negative anterior drawer, positive talar tilt, negative valgus stress, negative forced external rotation/eversion, negative Howell sign, negative squeeze test. Able to perform heel raise and Able to hop.        RADIOLOGY:  I independently ordered, visualized and reviewed these images with the patient  Possible distal fibula small avulsion fracture      Narrative & Impression   EXAM: XR ANKLE RIGHT G/E 3 VIEWS  LOCATION: [facility]  DATE/TIME: 1/11/2022 2:55 PM     INDICATION:  Pain in joint, ankle and foot, right, Right ankle swelling, Fall, initial encounter.  COMPARISON: None.                                                                      IMPRESSION: There is a tiny minimally displaced fracture of the distal tip of the lateral malleolus. This may be acute or subacute since there is soft tissue swelling about the lateral malleolus. Otherwise negative. "         Review of external notes as documented elsewhere in note  Review of the result(s) of each unique test - right ankle x-ray

## 2022-02-15 ENCOUNTER — ALLIED HEALTH/NURSE VISIT (OUTPATIENT)
Dept: FAMILY MEDICINE | Facility: CLINIC | Age: 47
End: 2022-02-15
Payer: COMMERCIAL

## 2022-02-15 ENCOUNTER — TELEPHONE (OUTPATIENT)
Dept: FAMILY MEDICINE | Facility: CLINIC | Age: 47
End: 2022-02-15

## 2022-02-15 VITALS — SYSTOLIC BLOOD PRESSURE: 114 MMHG | HEART RATE: 68 BPM | DIASTOLIC BLOOD PRESSURE: 90 MMHG

## 2022-02-15 DIAGNOSIS — I10 BENIGN ESSENTIAL HYPERTENSION: Primary | ICD-10-CM

## 2022-02-15 PROCEDURE — 99207 PR NO CHARGE NURSE ONLY: CPT

## 2022-02-15 NOTE — TELEPHONE ENCOUNTER
Would keep at current dosing and recheck blood pressure in 2 weeks, if diastolic still elevated may add in additional medication.     Allyn Carballo, DNP, APRN-CNP

## 2022-02-15 NOTE — PROGRESS NOTES
Romina Turner is a 46 year old year old patient who comes in today for a Blood Pressure check because of medication change. Hydrochlorothiazide dose decreased on 02/04/22  Vital Signs as repeated by /90 P 68, 114/90  Patient is taking medication as prescribed  Patient is tolerating medications well, all numbness and tingling subsided after decreasing hydrochlorothiazide.   Patient is not monitoring Blood Pressure at home.    Current complaints: none  Disposition:  patient to continue with the same medication, unless otherwise directed. Patient will need refills in 1 week. She will F/U in clinic for nurse only BP check in a couple of weeks.  Please advise.   Jessica BRADLEY RN

## 2022-02-17 ENCOUNTER — TELEPHONE (OUTPATIENT)
Dept: FAMILY MEDICINE | Facility: CLINIC | Age: 47
End: 2022-02-17
Payer: COMMERCIAL

## 2022-02-17 DIAGNOSIS — I10 BENIGN ESSENTIAL HYPERTENSION: ICD-10-CM

## 2022-02-17 NOTE — TELEPHONE ENCOUNTER
Call placed to patient.    See message blow. She has had arm pain past week, no numbness tingling in hands , fingers. No recent covid. No chest pain/pressure. No soa. Hands are warm.  She states like symptoms years ago with other medication.    She is advised to stop HCTZ. Will route symptoms to provider for further advise

## 2022-02-17 NOTE — TELEPHONE ENCOUNTER
Reason for Call:  Other    Detailed comments: Pt calling because she thinks she is having some side effects to a new medication she started on in late January. She thinks it's due to the hydrochlorothiazide medication. She was having some side effects when she first started this medication with her fingers and toes feeling tingly, they adjusted her dose and she is no longer having those symptoms. But now she is having symptoms of her left arm/shoulder feeling sore and a sharp type of pain. Been going on for a couple weeks she thinks. She is wondering if she should stop taking this medication, or if there is something she should do in regards to these side effects she is having from this medication. Would like a call back to discuss further.    Phone Number Patient can be reached at: Home number on file [phone] (home)    Best Time: anytime    Can we leave a detailed message on this number? YES    Call taken on 2/17/2022 at 12:59 PM by Stacey Page

## 2022-02-18 RX ORDER — LISINOPRIL 20 MG/1
20 TABLET ORAL DAILY
Qty: 30 TABLET | Refills: 0 | Status: SHIPPED | OUTPATIENT
Start: 2022-02-18 | End: 2022-03-11

## 2022-02-18 NOTE — TELEPHONE ENCOUNTER
Call placed too patient. We discussed provider recommendations, instructions. She is assisted to schedule annual visit.  She has no questions at this time

## 2022-02-18 NOTE — TELEPHONE ENCOUNTER
Agreed to stop hydrochlorothiazide.  Have her increase the lisinopril to 20 mg daily and schedule a routine physical with me in the next month.  I placed a new order for lisinopril to the pharmacy.    Thanks,  Allyn Carballo, DNP, APRN-CNP

## 2022-02-23 ENCOUNTER — TELEPHONE (OUTPATIENT)
Dept: FAMILY MEDICINE | Facility: CLINIC | Age: 47
End: 2022-02-23
Payer: COMMERCIAL

## 2022-02-23 NOTE — TELEPHONE ENCOUNTER
Reason for call:  Patient reporting a symptom    Symptom or request: Pt says she called last week and Allyn has alida trying to regulate her medication. She continues to have pain in her arm. Allyn took her off the hydrochlorothiazide and increased her Lisinopril. Pt is thinking this is from the Lisinopril. She asked if a nurse could please call her.     Have you been treated for this before? Yes    Additional comments: Kailyn Galeana [place]    Phone Number patient can be reached at:  Home number on file [phone] (home)    Best Time:  anytime    Can we leave a detailed message on this number:  YES    Call taken on 2/23/2022 at 12:29 PM by Samantha Ceja

## 2022-02-23 NOTE — TELEPHONE ENCOUNTER
L arm pain upper arm and shoulder has continued, pain is intermittent, much worse at night. Pain is unchanged after stopping hydrochlorothiazide. When pain restartes it is  initially sharp throbbing then feels achy. Pain started 1 month ago and pt says she thinks it is from the BP med. She can't sleep due to pain, ibuprofen and tylenol help very little as does heat, ice or positioning. Pt does not check BP at home, was encouraged to get a machine to check it. Message forwarded to Allyn Carballo for any further advice.

## 2022-02-24 NOTE — TELEPHONE ENCOUNTER
Please notify patient to stop all medication for a minimum of 2 weeks. Have her or help her schedule with me then in 2 weeks for blood pressure recheck and to discuss management.     Thanks,  Allyn Carballo, MANUEL, APRN-CNP

## 2022-03-05 ENCOUNTER — HEALTH MAINTENANCE LETTER (OUTPATIENT)
Age: 47
End: 2022-03-05

## 2022-03-11 ENCOUNTER — OFFICE VISIT (OUTPATIENT)
Dept: FAMILY MEDICINE | Facility: CLINIC | Age: 47
End: 2022-03-11
Payer: COMMERCIAL

## 2022-03-11 VITALS
SYSTOLIC BLOOD PRESSURE: 136 MMHG | BODY MASS INDEX: 30.48 KG/M2 | WEIGHT: 172 LBS | HEIGHT: 63 IN | RESPIRATION RATE: 12 BRPM | OXYGEN SATURATION: 100 % | DIASTOLIC BLOOD PRESSURE: 100 MMHG | HEART RATE: 78 BPM

## 2022-03-11 DIAGNOSIS — Z12.31 VISIT FOR SCREENING MAMMOGRAM: ICD-10-CM

## 2022-03-11 DIAGNOSIS — Z11.4 SCREENING FOR HIV (HUMAN IMMUNODEFICIENCY VIRUS): ICD-10-CM

## 2022-03-11 DIAGNOSIS — Z12.11 SCREEN FOR COLON CANCER: ICD-10-CM

## 2022-03-11 DIAGNOSIS — I10 HYPERTENSION GOAL BP (BLOOD PRESSURE) < 140/90: ICD-10-CM

## 2022-03-11 DIAGNOSIS — Z00.00 ROUTINE GENERAL MEDICAL EXAMINATION AT A HEALTH CARE FACILITY: Primary | ICD-10-CM

## 2022-03-11 DIAGNOSIS — Z11.59 NEED FOR HEPATITIS C SCREENING TEST: ICD-10-CM

## 2022-03-11 DIAGNOSIS — R06.83 SNORING: ICD-10-CM

## 2022-03-11 DIAGNOSIS — Z13.220 SCREENING FOR HYPERLIPIDEMIA: ICD-10-CM

## 2022-03-11 DIAGNOSIS — H69.93 DYSFUNCTION OF BOTH EUSTACHIAN TUBES: ICD-10-CM

## 2022-03-11 DIAGNOSIS — G40.909 SEIZURE DISORDER (H): ICD-10-CM

## 2022-03-11 PROCEDURE — 87624 HPV HI-RISK TYP POOLED RSLT: CPT | Performed by: NURSE PRACTITIONER

## 2022-03-11 PROCEDURE — 99214 OFFICE O/P EST MOD 30 MIN: CPT | Mod: 25 | Performed by: NURSE PRACTITIONER

## 2022-03-11 PROCEDURE — 99396 PREV VISIT EST AGE 40-64: CPT | Performed by: NURSE PRACTITIONER

## 2022-03-11 PROCEDURE — G0145 SCR C/V CYTO,THINLAYER,RESCR: HCPCS | Performed by: NURSE PRACTITIONER

## 2022-03-11 RX ORDER — LOSARTAN POTASSIUM 25 MG/1
50 TABLET ORAL DAILY
Qty: 60 TABLET | Refills: 0 | Status: SHIPPED | OUTPATIENT
Start: 2022-03-11 | End: 2022-04-11

## 2022-03-11 RX ORDER — FLUTICASONE PROPIONATE 50 MCG
2 SPRAY, SUSPENSION (ML) NASAL DAILY
Qty: 16 G | Refills: 1 | Status: SHIPPED | OUTPATIENT
Start: 2022-03-11

## 2022-03-11 ASSESSMENT — ENCOUNTER SYMPTOMS
HEARTBURN: 0
HEMATURIA: 0
DIARRHEA: 0
FREQUENCY: 0
HEADACHES: 0
WEAKNESS: 0
JOINT SWELLING: 0
NAUSEA: 0
SORE THROAT: 0
SHORTNESS OF BREATH: 0
HEMATOCHEZIA: 0
CONSTIPATION: 0
FEVER: 0
PALPITATIONS: 0
BREAST MASS: 0
ABDOMINAL PAIN: 0
ARTHRALGIAS: 1
COUGH: 0
NERVOUS/ANXIOUS: 0
DYSURIA: 0
EYE PAIN: 0
CHILLS: 0
PARESTHESIAS: 0
MYALGIAS: 1
DIZZINESS: 0

## 2022-03-11 ASSESSMENT — PAIN SCALES - GENERAL: PAINLEVEL: NO PAIN (0)

## 2022-03-11 NOTE — NURSING NOTE
"Chief Complaint   Patient presents with     Physical     with pap     BP (!) 136/100   Pulse 78   Resp 12   Ht 1.594 m (5' 2.75\")   Wt 78 kg (172 lb)   LMP 03/07/2022   SpO2 100%   BMI 30.71 kg/m   Estimated body mass index is 30.71 kg/m  as calculated from the following:    Height as of this encounter: 1.594 m (5' 2.75\").    Weight as of this encounter: 78 kg (172 lb).  Patient presents to the clinic using No DME      Health Maintenance that is potentially due pending provider review:    Health Maintenance Due   Topic Date Due     PREVENTIVE CARE VISIT  Never done     ANNUAL REVIEW OF HM ORDERS  Never done     ADVANCE CARE PLANNING  Never done     COLORECTAL CANCER SCREENING  Never done     HIV SCREENING  Never done     HEPATITIS C SCREENING  Never done     DTAP/TDAP/TD IMMUNIZATION (1 - Tdap) Never done     MAMMO SCREENING  11/28/2018     LIPID  Never done     HPV TEST  09/15/2019     PAP  09/15/2019     INFLUENZA VACCINE (1) 09/01/2021        Pended.        "

## 2022-03-11 NOTE — PROGRESS NOTES
SUBJECTIVE:   CC: Romina Turner is an 46 year old woman who presents for preventive health visit.       Patient has been advised of split billing requirements and indicates understanding: Yes  Healthy Habits:     Getting at least 3 servings of Calcium per day:  Yes    Bi-annual eye exam:  NO    Dental care twice a year:  Yes    Sleep apnea or symptoms of sleep apnea:  Excessive snoring    Diet:  Regular (no restrictions)    Frequency of exercise:  None    Taking medications regularly:  Yes    Medication side effects:  Muscle aches    PHQ-2 Total Score: 0    Additional concerns today:  No    Seizure history   No seizure since 2016    Hypertension Follow-up - stopped Lisinopril due to arm pain      Do you check your blood pressure regularly outside of the clinic? No     Are you following a low salt diet? No    Are your blood pressures ever more than 140 on the top number (systolic) OR more   than 90 on the bottom number (diastolic), for example 140/90?       Today's PHQ-2 Score:   PHQ-2 ( 1999 [employer]) 3/11/2022   Q1: Little interest or pleasure in doing things 0   Q2: Feeling down, depressed or hopeless 0   PHQ-2 Score 0   Q1: Little interest or pleasure in doing things Not at all   Q2: Feeling down, depressed or hopeless Not at all   PHQ-2 Score 0       Abuse: Current or Past (Physical, Sexual or Emotional) - No  Do you feel safe in your environment? Yes    Have you ever done Advance Care Planning? (For example, a Health Directive, POLST, or a discussion with a medical provider or your loved ones about your wishes): No, advance care planning information given to patient to review.  Patient declined advance care planning discussion at this time.    Social History     Tobacco Use     Smoking status: Never Smoker     Smokeless tobacco: Never Used   Substance Use Topics     Alcohol use: Yes     Comment: RARE     If you drink alcohol do you typically have >3 drinks per day or >7 drinks per week? No    Alcohol Use  3/11/2022   Prescreen: >3 drinks/day or >7 drinks/week? No   Prescreen: >3 drinks/day or >7 drinks/week? -       Reviewed orders with patient.  Reviewed health maintenance and updated orders accordingly - Yes  Lab work is in process  Labs reviewed in EPIC  BP Readings from Last 3 Encounters:   03/11/22 (!) 136/100   02/15/22 (!) 114/90   02/14/22 122/76    Wt Readings from Last 3 Encounters:   03/11/22 78 kg (172 lb)   02/14/22 78.5 kg (173 lb)   01/21/22 78.6 kg (173 lb 3.2 oz)                  Patient Active Problem List   Diagnosis     CARDIOVASCULAR SCREENING; LDL GOAL LESS THAN 160     Depression with anxiety     Migraine without status migrainosus, not intractable, unspecified migraine type     Seizure disorder (H)     Encounter for screening for cardiovascular disorders     Hypertension goal BP (blood pressure) < 140/90     No past surgical history on file.    Social History     Tobacco Use     Smoking status: Never Smoker     Smokeless tobacco: Never Used   Substance Use Topics     Alcohol use: Yes     Comment: RARE     Family History   Problem Relation Age of Onset     Hypertension Mother      Eye Disorder Mother      Heart Disease Mother      Depression Mother      Hyperlipidemia Mother      Cancer Father         BLADER     Family History Negative No family hx of          Current Outpatient Medications   Medication Sig Dispense Refill     fluticasone (FLONASE) 50 MCG/ACT nasal spray Spray 2 sprays into both nostrils daily 16 g 1     lamoTRIgine (LAMICTAL) 100 MG tablet Take 1 tablet (100 mg) by mouth daily 30 tablet 0     losartan (COZAAR) 25 MG tablet Take 2 tablets (50 mg) by mouth daily 60 tablet 0     rizatriptan (MAXALT) 10 MG tablet Take 1 tablet (10 mg) by mouth at onset of headache for migraine May repeat in 2 hours. Max 3 tablets/24 hours. 18 tablet 1     lisinopril (ZESTRIL) 20 MG tablet Take 1 tablet (20 mg) by mouth daily (Patient not taking: Reported on 3/11/2022) 30 tablet 0     Allergies    Allergen Reactions     Pcn [Penicillins] Rash     Sulfa Drugs Rash       Breast Cancer Screening:    FHS-7:   Breast CA Risk Assessment (FHS-7) 3/11/2022   Did any of your first-degree relatives have breast or ovarian cancer? Unknown   Did any of your relatives have bilateral breast cancer? Unknown   Did any man in your family have breast cancer? No   Did any woman in your family have breast and ovarian cancer? Yes   Did any woman in your family have breast cancer before age 50 y? No   Do you have 2 or more relatives with breast and/or ovarian cancer? No   Do you have 2 or more relatives with breast and/or bowel cancer? No       Mammogram Screening: Recommended annual mammography     Maternal grandmother with history of breast cancer  Dad with history of bladder cancer    Pertinent mammograms are reviewed under the imaging tab.    History of abnormal Pap smear: NO - age 30-65 PAP every 5 years with negative HPV co-testing recommended  PAP / HPV Latest Ref Rng & Units 3/11/2022 9/15/2016   PAP   Negative for Intraepithelial Lesion or Malignancy (NILM) -   PAP (Historical) - - NIL   HPV16 Negative Negative Negative   HPV18 Negative Negative Negative   HRHPV Negative Negative Negative     Reviewed and updated as needed this visit by clinical staff   Tobacco  Allergies  Meds  Problems             Reviewed and updated as needed this visit by Provider     Meds  Problems            No past medical history on file.   No past surgical history on file.  OB History   No obstetric history on file.       Review of Systems   Constitutional: Negative for chills and fever.   HENT: Negative for congestion, ear pain, hearing loss and sore throat.    Eyes: Negative for pain and visual disturbance.   Respiratory: Negative for cough and shortness of breath.    Cardiovascular: Negative for chest pain, palpitations and peripheral edema.   Gastrointestinal: Negative for abdominal pain, constipation, diarrhea, heartburn,  "hematochezia and nausea.   Breasts:  Negative for tenderness, breast mass and discharge.   Genitourinary: Negative for dysuria, frequency, genital sores, hematuria, pelvic pain, urgency, vaginal bleeding and vaginal discharge.   Musculoskeletal: Positive for arthralgias and myalgias. Negative for joint swelling.   Skin: Negative for rash.   Neurological: Negative for dizziness, weakness, headaches and paresthesias.   Psychiatric/Behavioral: Negative for mood changes. The patient is not nervous/anxious.         OBJECTIVE:   BP (!) 136/100   Pulse 78   Resp 12   Ht 1.594 m (5' 2.75\")   Wt 78 kg (172 lb)   LMP 03/07/2022   SpO2 100%   BMI 30.71 kg/m    Physical Exam  GENERAL: healthy, alert and no distress  EYES: Eyes grossly normal to inspection, PERRL and conjunctivae and sclerae normal  HENT: ear canals and TM's normal, nose and mouth without ulcers or lesions  NECK: no adenopathy, no asymmetry, masses, or scars and thyroid normal to palpation  RESP: lungs clear to auscultation - no rales, rhonchi or wheezes  BREAST: normal without masses, tenderness or nipple discharge and no palpable axillary masses or adenopathy  CV: regular rate and rhythm, normal S1 S2, no S3 or S4, no murmur, click or rub, no peripheral edema and peripheral pulses strong  ABDOMEN: soft, nontender, no hepatosplenomegaly, no masses and bowel sounds normal   (female): normal female external genitalia, normal urethral meatus, vaginal mucosa pink, moist, well rugated, and normal cervix/adnexa/uterus without masses or discharge  MS: no gross musculoskeletal defects noted, no edema  SKIN: no suspicious lesions or rashes  NEURO: Normal strength and tone, mentation intact and speech normal  PSYCH: mentation appears normal, affect normal/bright    Diagnostic Test Results:  Labs reviewed in Epic  Results for orders placed or performed in visit on 03/11/22   HPV High Risk Types DNA Cervical     Status: None   Result Value Ref Range    Other HR " HPV Negative Negative    HPV16 DNA Negative Negative    HPV18 DNA Negative Negative    FINAL DIAGNOSIS       This patient's sample is negative for HPV DNA.        This test was developed and its performance characteristics determined by the [facility]. It has not been cleared or approved by the FDA. The laboratory is regulated under CLIA as qualified to perform high-complexity testing. This test is used for clinical purposes. It should not be regarded as investigational or for research.    METHODOLOGY: The Roche Yasmeen 4800 system uses automated extraction, simultaneous amplification of HPV (L1 region) and beta-globin, followed by real time detection of fluorescent labeled HPV and beta globin using specific oligonucleotide probes. The test specifically identified types HPV 16 DNA and HPV 18 DNA while concurrently detecting the rest of the high risk types (31, 33, 35, 39, 45, 51, 52, 56, 58, 59, 66 or 68).    COMMENTS: This test is not intended for use as a screening device for woman under age 30 with normal cervical cytology. Results should be correlated with cytologic and histologic findings. Close clinical followup is recommended.       Gynecologic Cytology (PAP)     Status: None   Result Value Ref Range    Interpretation        Negative for Intraepithelial Lesion or Malignancy (NILM)    Comment         Papanicolaou Test Limitations:  Cervical cytology is a screening test with limited sensitivity, and regular screening is critical for cancer prevention.  Pap tests are primarily effective for the diagnosis/prevention of squamous cell carcinoma, not adenocarcinoma or other cancers.        Specimen Adequacy       Satisfactory for evaluation, endocervical/transformation zone component absent    Clinical Information       none      Reflex Testing Yes regardless of result     Previous Abnormal?       No      Performing Labs       The technical component of  this testing was completed at [facility]         ASSESSMENT/PLAN:   (Z00.00) Routine general medical examination at a health care facility  (primary encounter diagnosis)  Plan: Gynecologic Cytology (PAP), Basic metabolic         panel  (Ca, Cl, CO2, Creat, Gluc, K, Na, BUN),         HPV High Risk Types DNA Cervical          (G40.909) Seizure disorder (H)  Comment: History of seizures, onset 2016.  Follows neurology in [place], sees yearly.  Last visit in November 2021.  Currently on Lamictal, has not had a seizure since.  Continue medications without any changes and follow-up with me neurology yearly as scheduled.    (I10) Hypertension goal BP (blood pressure) < 140/90  Comment: Chronic, strong family history.  Recently started medications, hydrochlorothiazide and lisinopril with side effects to both.  Side effects resolved since stopping.  Blood pressure still significantly elevated.  Will start losartan today at 50 mg daily and following a Mediterranean diet.  Will have patient recheck in 2 weeks with nurse only blood pressure check and labs.  Advised patient to come in fasting, will get cholesterol levels at this time as well.  Plan: Basic metabolic panel  (Ca, Cl, CO2, Creat,         Gluc, K, Na, BUN), losartan (COZAAR) 25 MG         tablet          (R06.83) Snoring  Comment: Recent history of snoring over the last year, notable by .  Does not feel significantly tired during the day.  Had sleep study done around 2016 after seizure which was normal at that time.  Patient does have fluid buildup in bilateral ears and also drainage which patient coughs on at night, which may contributing.  Recommend treatment as below and if not improving in approximately 1 month patient to notify provider and will order a repeat sleep valuation.    (H69.83) Dysfunction of both eustachian tubes  Comment: Fluid noted in bilateral ears, could be contributing to  "postnasal drainage causing snoring as above.  Recommend starting Flonase nasal spray, 2 sprays each nostril daily.  Reviewed proper administration instructions with patient.  Also recommend starting an over-the-counter antihistamine such as Claritin or Zyrtec daily.  Continue for a minimum of 1 month, if symptoms not improving follow-up with provider as above.  Plan: fluticasone (FLONASE) 50 MCG/ACT nasal spray          (Z12.11) Screen for colon cancer  Comment: Due for colon cancer screening, orders placed.  Patient to be contacted to schedule.  Plan: Adult Gastro Ref - Procedure Only          (Z12.31) Visit for screening mammogram  Comment: Due for mammogram screening, orders placed.  Patient to be contacted to schedule.  Plan: MA SCREENING DIGITAL BILAT - Future  (s+30)          (Z13.220) Screening for hyperlipidemia  Comment: Due for cholesterol screening, will be scheduled for a lab in 2 weeks.  Advised to come in fasting at least 8 to 10 hours.  Plan: Lipid panel reflex to direct LDL Fasting          (Z11.4) Screening for HIV (human immunodeficiency virus)  Comment: Reviewed CDC recommendations, orders placed.  Plan: HIV Antigen Antibody Combo          (Z11.59) Need for hepatitis C screening test  Comment: Reviewed CDC recommendations, orders placed.  Plan: Hepatitis C Screen Reflex to HCV RNA Quant and         Genotype            Patient has been advised of split billing requirements and indicates understanding: Yes    COUNSELING:  Reviewed preventive health counseling, as reflected in patient instructions    Estimated body mass index is 30.71 kg/m  as calculated from the following:    Height as of this encounter: 1.594 m (5' 2.75\").    Weight as of this encounter: 78 kg (172 lb).    Weight management plan: Discussed healthy diet and exercise guidelines    She reports that she has never smoked. She has never used smokeless tobacco.      Counseling Resources:  ATP IV Guidelines  Pooled Cohorts Equation " Calculator  Breast Cancer Risk Calculator  BRCA-Related Cancer Risk Assessment: FHS-7 Tool  FRAX Risk Assessment  ICSI Preventive Guidelines  Dietary Guidelines for Americans, 2010  USDA's MyPlate  ASA Prophylaxis  Lung CA Screening    Allyn Carballo DNP, APRN-CNP   [facility]    Chart documentation with Dragon Voice recognition Software. Although reviewed after completion, some words and grammatical errors may remain.

## 2022-03-11 NOTE — PATIENT INSTRUCTIONS
(Z00.00) Routine general medical examination at a health care facility  (primary encounter diagnosis)  Plan: Gynecologic Cytology (PAP), Basic metabolic         panel  (Ca, Cl, CO2, Creat, Gluc, K, Na, BUN),         HPV High Risk Types DNA Cervical          (G40.909) Seizure disorder (H)  Comment: History of seizures, onset 2016.  Follows neurology in [place], sees yearly.  Last visit in November 2021.  Currently on Lamictal, has not had a seizure since.  Continue medications without any changes and follow-up with me neurology yearly as scheduled.    (I10) Hypertension goal BP (blood pressure) < 140/90  Comment: Chronic, strong family history.  Recently started medications, hydrochlorothiazide and lisinopril with side effects to both.  Side effects resolved since stopping.  Blood pressure still significantly elevated.  Will start losartan today at 50 mg daily and following a Mediterranean diet.  Will have patient recheck in 2 weeks with nurse only blood pressure check and labs.  Advised patient to come in fasting, will get cholesterol levels at this time as well.  Plan: Basic metabolic panel  (Ca, Cl, CO2, Creat,         Gluc, K, Na, BUN), losartan (COZAAR) 25 MG         tablet          (R06.83) Snoring  Comment: Recent history of snoring over the last year, notable by .  Does not feel significantly tired during the day.  Had sleep study done around 2016 after seizure which was normal at that time.  Patient does have fluid buildup in bilateral ears and also drainage which patient coughs on at night, which may contributing.  Recommend treatment as below and if not improving in approximately 1 month patient to notify provider and will order a repeat sleep valuation.    (H69.83) Dysfunction of both eustachian tubes  Comment: Fluid noted in bilateral ears, could be contributing to postnasal drainage causing snoring as above.  Recommend starting Flonase nasal spray, 2 sprays each nostril daily.  Reviewed proper  administration instructions with patient.  Also recommend starting an over-the-counter antihistamine such as Claritin or Zyrtec daily.  Continue for a minimum of 1 month, if symptoms not improving follow-up with provider as above.  Plan: fluticasone (FLONASE) 50 MCG/ACT nasal spray          (Z12.11) Screen for colon cancer  Comment: Due for colon cancer screening, orders placed.  Patient to be contacted to schedule.  Plan: Adult Gastro Ref - Procedure Only          (Z12.31) Visit for screening mammogram  Comment: Due for mammogram screening, orders placed.  Patient to be contacted to schedule.  Plan: MA SCREENING DIGITAL BILAT - Future  (s+30)          (Z13.220) Screening for hyperlipidemia  Comment: Due for cholesterol screening, will be scheduled for a lab in 2 weeks.  Advised to come in fasting at least 8 to 10 hours.  Plan: Lipid panel reflex to direct LDL Fasting          (Z11.4) Screening for HIV (human immunodeficiency virus)  Comment: Reviewed CDC recommendations, orders placed.  Plan: HIV Antigen Antibody Combo          (Z11.59) Need for hepatitis C screening test  Comment: Reviewed CDC recommendations, orders placed.  Plan: Hepatitis C Screen Reflex to HCV RNA Quant and         Genotype      Preventive Health Recommendations  Female Ages 40 to 49    Yearly exam:     See your health care provider every year in order to  1. Review health changes.   2. Discuss preventive care.    3. Review your medicines if your doctor prescribed any.      Get a Pap test every three years (unless you have an abnormal result and your provider advises testing more often).      If you get Pap tests with HPV test, you only need to test every 5 years, unless you have an abnormal result. You do not need a Pap test if your uterus was removed (hysterectomy) and you have not had cancer.      You should be tested each year for STDs (sexually transmitted diseases), if you're at risk.     Ask your doctor if you should have a  mammogram.      Have a colonoscopy (test for colon cancer) if someone in your family has had colon cancer or polyps before age 50.       Have a cholesterol test every 5 years.       Have a diabetes test (fasting glucose) after age 45. If you are at risk for diabetes, you should have this test every 3 years.    Shots: Get a flu shot each year. Get a tetanus shot every 10 years.     Nutrition:     Eat at least 5 servings of fruits and vegetables each day.    Eat whole-grain bread, whole-wheat pasta and brown rice instead of white grains and rice.    Get adequate Calcium and Vitamin D.      Lifestyle    Exercise at least 150 minutes a week (an average of 30 minutes a day, 5 days a week). This will help you control your weight and prevent disease.    Limit alcohol to one drink per day.    No smoking.     Wear sunscreen to prevent skin cancer.    See your dentist every six months for an exam and cleaning.  Patient Education     Understanding the Mediterranean Diet  A Mediterranean-style diet is a healthy way of eating, not a specific diet to lose weight. It includes a lot of foods from plants such as vegetables, fruits, and whole grains, plus olive oil and seafood. It also includes dairy foods and meats, but in smaller amounts. And it includes a moderate amount of wine. Many studies over time have shown health benefits to eating this way. It focuses on making fresh food that s full of flavor.  This plan of eating is inspired by how people eat in countries around the [place]. They include [place], [place], [place], [place], [place], and [place]. But it uses foods you can buy in almost any grocery store.  Health benefits of a Mediterranean diet  This diet is high in fiber, lean protein, and healthy oils. It s low in saturated fats and sugar. The diet has been shown to help prevent or manage:  Depression  Diabetes  Heart disease  High blood pressure  Parkinson disease  Alzheimer disease  Cancers of the colon, prostate,  and breast  What do I eat on a Mediterranean diet?  Plan each meal around vegetables and whole grains. Use olive oil. Add nuts and legumes. Include fish or lean protein. Foods to focus your meals around include:  Food type What to eat   Vegetables This includes leafy greens, tomatoes, squash, peppers, cucumbers, green beans, eggplant, avocados, potatoes, and olives. You can use fresh or frozen vegetables.   Fruits This includes apples, raspberries, strawberries, grapes, citrus fruit such as oranges and grapefruit, stone fruit such as apricots and peaches, plus figs, dates, and melon.   Whole grains This includes brown rice, whole oats, quinoa, millet, whole grain bread, whole-wheat pasta, and crackers made with whole grains.   Beans and legumes These include lentils, chickpeas, and beans such as [place], florencio, kidney, and black beans. Peanuts are also legumes.   Seafood This includes fish such as salmon, trout, mackerel, cayetano, tuna, sardines, anchovies, and whitefish. It also includes shellfish such as shrimp, oysters, mussels, and clams.   Nuts and seeds These include walnuts, almonds, sunflower seeds, cashews, brazil nuts, and pecans.   Healthy oil Olive oil is the most common oil in the Mediterranean diet. But other healthy oils are canola, sunflower, safflower, and corn oils.   Herbs and spices Season food with oregano, pepper, perfecto, tarragon, thyme, basil, cinnamon, and cumin.   Wine Enjoy a glass of wine each day with a meal. Skip this if you need to not have alcohol for any reason.   Foods to eat in smaller amounts  You can add these foods in a few days a week, in smaller amounts:  Dairy foods, such as cheese, yogurt, and butter  Poultry, such as chicken and duck  Eggs  Occasional treats  Limit these foods in your weekly eating plan:  Red meats, such as beef, lamb, and pork  Refined grains, such as white rice and foods made with white flour  Sugary treats, such as chocolate, candy, or pastries  Adding lots  of flavor  You can liven up fresh foods with many kinds of flavor. Try these sauces, dips, and seasonings:  Hummus  Marinara sauce  Salsa  Vinaigrette dressing  Tips for eating out  At restaurants:  Skip fried foods. These have a lot of saturated fat.  Look for fish dishes that are cooked without cream or butter.  Pick salads that have nuts and seeds.  Choose vegetarian options that don t have too much cheese.  [employer] last reviewed this educational content on 5/1/2020 2000-2021 [employer]. All rights reserved. This information is not intended as a substitute for professional medical care. Always follow your healthcare professional's instructions.

## 2022-03-15 LAB
BKR LAB AP GYN ADEQUACY: NORMAL
BKR LAB AP GYN INTERPRETATION: NORMAL
BKR LAB AP HPV REFLEX: NORMAL
BKR LAB AP PREVIOUS ABNORMAL: NORMAL
PATH REPORT.COMMENTS IMP SPEC: NORMAL
PATH REPORT.COMMENTS IMP SPEC: NORMAL
PATH REPORT.RELEVANT HX SPEC: NORMAL

## 2022-03-16 LAB
HUMAN PAPILLOMA VIRUS 16 DNA: NEGATIVE
HUMAN PAPILLOMA VIRUS 18 DNA: NEGATIVE
HUMAN PAPILLOMA VIRUS FINAL DIAGNOSIS: NORMAL
HUMAN PAPILLOMA VIRUS OTHER HR: NEGATIVE

## 2022-03-17 DIAGNOSIS — Z11.59 ENCOUNTER FOR SCREENING FOR OTHER VIRAL DISEASES: Primary | ICD-10-CM

## 2022-03-30 ENCOUNTER — LAB (OUTPATIENT)
Dept: LAB | Facility: CLINIC | Age: 47
End: 2022-03-30
Payer: COMMERCIAL

## 2022-03-30 DIAGNOSIS — Z13.220 SCREENING FOR HYPERLIPIDEMIA: ICD-10-CM

## 2022-03-30 DIAGNOSIS — Z11.4 SCREENING FOR HIV (HUMAN IMMUNODEFICIENCY VIRUS): ICD-10-CM

## 2022-03-30 DIAGNOSIS — I10 HYPERTENSION GOAL BP (BLOOD PRESSURE) < 140/90: ICD-10-CM

## 2022-03-30 DIAGNOSIS — Z11.59 NEED FOR HEPATITIS C SCREENING TEST: ICD-10-CM

## 2022-03-30 DIAGNOSIS — Z00.00 ROUTINE GENERAL MEDICAL EXAMINATION AT A HEALTH CARE FACILITY: ICD-10-CM

## 2022-03-30 LAB
ANION GAP SERPL CALCULATED.3IONS-SCNC: 5 MMOL/L (ref 3–14)
BUN SERPL-MCNC: 10 MG/DL (ref 7–30)
CALCIUM SERPL-MCNC: 8.8 MG/DL (ref 8.5–10.1)
CHLORIDE BLD-SCNC: 108 MMOL/L (ref 94–109)
CHOLEST SERPL-MCNC: 192 MG/DL
CO2 SERPL-SCNC: 27 MMOL/L (ref 20–32)
CREAT SERPL-MCNC: 0.78 MG/DL (ref 0.52–1.04)
FASTING STATUS PATIENT QL REPORTED: YES
GFR SERPL CREATININE-BSD FRML MDRD: >90 ML/MIN/1.73M2
GLUCOSE BLD-MCNC: 92 MG/DL (ref 70–99)
HDLC SERPL-MCNC: 48 MG/DL
LDLC SERPL CALC-MCNC: 123 MG/DL
NONHDLC SERPL-MCNC: 144 MG/DL
POTASSIUM BLD-SCNC: 3.8 MMOL/L (ref 3.4–5.3)
SODIUM SERPL-SCNC: 140 MMOL/L (ref 133–144)
TRIGL SERPL-MCNC: 106 MG/DL

## 2022-03-30 PROCEDURE — 87389 HIV-1 AG W/HIV-1&-2 AB AG IA: CPT

## 2022-03-30 PROCEDURE — 80048 BASIC METABOLIC PNL TOTAL CA: CPT

## 2022-03-30 PROCEDURE — 36415 COLL VENOUS BLD VENIPUNCTURE: CPT

## 2022-03-30 PROCEDURE — 86803 HEPATITIS C AB TEST: CPT

## 2022-03-30 PROCEDURE — 80061 LIPID PANEL: CPT

## 2022-03-31 ENCOUNTER — TELEPHONE (OUTPATIENT)
Dept: FAMILY MEDICINE | Facility: CLINIC | Age: 47
End: 2022-03-31

## 2022-03-31 ENCOUNTER — ALLIED HEALTH/NURSE VISIT (OUTPATIENT)
Dept: FAMILY MEDICINE | Facility: CLINIC | Age: 47
End: 2022-03-31
Payer: COMMERCIAL

## 2022-03-31 VITALS — HEART RATE: 72 BPM | DIASTOLIC BLOOD PRESSURE: 76 MMHG | SYSTOLIC BLOOD PRESSURE: 132 MMHG

## 2022-03-31 DIAGNOSIS — I10 HYPERTENSION GOAL BP (BLOOD PRESSURE) < 140/90: Primary | ICD-10-CM

## 2022-03-31 LAB
HCV AB SERPL QL IA: NONREACTIVE
HIV 1+2 AB+HIV1 P24 AG SERPL QL IA: NONREACTIVE

## 2022-03-31 PROCEDURE — 99207 PR NO CHARGE NURSE ONLY: CPT

## 2022-03-31 NOTE — TELEPHONE ENCOUNTER
Romina Turner is a 46 year old year old patient who comes in today for a Blood Pressure check because of medication change. Started losartan 50 mg daily per 03-11-22 OV.  BP Readings from Last 6 Encounters:   03/31/22 132/76   03/11/22 (!) 136/100   02/15/22 (!) 114/90   02/14/22 122/76   02/04/22 108/84   01/21/22 (!) 142/108     HR 72  Patient is taking medication as prescribed  Patient is tolerating medications well.  Patient is not monitoring Blood Pressure at home.    Current complaints: none  Disposition:  patient to continue with the same medication. Forwarded to Allyn for review.  JAIRO Lama RN

## 2022-04-19 ENCOUNTER — LAB (OUTPATIENT)
Dept: LAB | Facility: CLINIC | Age: 47
End: 2022-04-19
Payer: COMMERCIAL

## 2022-04-19 DIAGNOSIS — Z11.59 ENCOUNTER FOR SCREENING FOR OTHER VIRAL DISEASES: ICD-10-CM

## 2022-04-19 PROCEDURE — U0003 INFECTIOUS AGENT DETECTION BY NUCLEIC ACID (DNA OR RNA); SEVERE ACUTE RESPIRATORY SYNDROME CORONAVIRUS 2 (SARS-COV-2) (CORONAVIRUS DISEASE [COVID-19]), AMPLIFIED PROBE TECHNIQUE, MAKING USE OF HIGH THROUGHPUT TECHNOLOGIES AS DESCRIBED BY CMS-2020-01-R: HCPCS

## 2022-04-19 PROCEDURE — U0005 INFEC AGEN DETEC AMPLI PROBE: HCPCS

## 2022-04-20 ENCOUNTER — HOSPITAL ENCOUNTER (OUTPATIENT)
Dept: MAMMOGRAPHY | Facility: CLINIC | Age: 47
Discharge: HOME | End: 2022-04-20
Attending: NURSE PRACTITIONER | Admitting: NURSE PRACTITIONER
Payer: COMMERCIAL

## 2022-04-20 ENCOUNTER — ANESTHESIA EVENT (OUTPATIENT)
Dept: GASTROENTEROLOGY | Facility: CLINIC | Age: 47
End: 2022-04-20
Payer: COMMERCIAL

## 2022-04-20 DIAGNOSIS — Z12.31 VISIT FOR SCREENING MAMMOGRAM: ICD-10-CM

## 2022-04-20 LAB — SARS-COV-2 RNA RESP QL NAA+PROBE: NEGATIVE

## 2022-04-20 PROCEDURE — 77067 SCR MAMMO BI INCL CAD: CPT

## 2022-04-20 RX ORDER — LORATADINE 10 MG/1
10 TABLET ORAL DAILY
COMMUNITY

## 2022-04-20 ASSESSMENT — ENCOUNTER SYMPTOMS: SEIZURES: 1

## 2022-04-20 NOTE — ANESTHESIA PREPROCEDURE EVALUATION
Anesthesia Pre-Procedure Evaluation    Patient: Romina Turner   MRN: [id number] : 1975        Procedure : Procedure(s):  COLONOSCOPY          No past medical history on file.   No past surgical history on file.   Allergies   Allergen Reactions     Pcn [Penicillins] Rash     Sulfa Drugs Rash      Social History     Tobacco Use     Smoking status: Never Smoker     Smokeless tobacco: Never Used   Substance Use Topics     Alcohol use: Yes     Comment: RARE      Wt Readings from Last 1 Encounters:   22 78 kg (172 lb)        Anesthesia Evaluation   Pt has had prior anesthetic.     No history of anesthetic complications       ROS/MED HX  ENT/Pulmonary:     (+) ROBERTA risk factors, hypertension,     Neurologic:     (+) migraines, seizures,     Cardiovascular:     (+) hypertension-----    METS/Exercise Tolerance:     Hematologic:  - neg hematologic  ROS     Musculoskeletal:  - neg musculoskeletal ROS     GI/Hepatic:     (+) bowel prep,     Renal/Genitourinary:  - neg Renal ROS     Endo:     (+) Obesity,     Psychiatric/Substance Use:     (+) psychiatric history anxiety and depression     Infectious Disease:  - neg infectious disease ROS     Malignancy:  - neg malignancy ROS     Other:  - neg other ROS          Physical Exam    Airway  airway exam normal      Mallampati: II   TM distance: > 3 FB   Neck ROM: full     Respiratory Devices and Support         Dental  no notable dental history         Cardiovascular   cardiovascular exam normal          Pulmonary   pulmonary exam normal                OUTSIDE LABS:  CBC: No results found for: WBC, HGB, HCT, PLT  BMP:   Lab Results   Component Value Date     2022     2022    POTASSIUM 3.8 2022    POTASSIUM 3.4 2022    CHLORIDE 108 2022    CHLORIDE 106 2022    CO2 27 2022    CO2 27 2022    BUN 10 2022    BUN 10 2022    CR 0.78 2022    CR 0.66 2022    GLC 92 2022    GLC 90 2022      COAGS: No results found for: PTT, INR, FIBR  POC: No results found for: BGM, HCG, HCGS  HEPATIC: No results found for: ALBUMIN, PROTTOTAL, ALT, AST, GGT, ALKPHOS, BILITOTAL, BILIDIRECT, NEVILLE  OTHER:   Lab Results   Component Value Date    OBDULIO 8.8 03/30/2022    CRP 7.7 10/16/2019    SED 9 10/16/2019       Anesthesia Plan    ASA Status:  3   NPO Status:  NPO Appropriate    Anesthesia Type: General.     - Airway: Native airway      Maintenance: Balanced.        Consents    Anesthesia Plan(s) and associated risks, benefits, and realistic alternatives discussed. Questions answered and patient/representative(s) expressed understanding.     - Discussed: Risks, Benefits and Alternatives for BOTH SEDATION and the PROCEDURE were discussed     - Discussed with:  Patient         Postoperative Care            Comments:                ADRIENNE Moss CRNA

## 2022-04-21 ENCOUNTER — TELEPHONE (OUTPATIENT)
Dept: FAMILY MEDICINE | Facility: CLINIC | Age: 47
End: 2022-04-21
Payer: COMMERCIAL

## 2022-04-22 ENCOUNTER — ANESTHESIA (OUTPATIENT)
Dept: GASTROENTEROLOGY | Facility: CLINIC | Age: 47
End: 2022-04-22
Payer: COMMERCIAL

## 2022-04-22 ENCOUNTER — HOSPITAL ENCOUNTER (OUTPATIENT)
Facility: CLINIC | Age: 47
Discharge: HOME | End: 2022-04-22
Attending: SURGERY | Admitting: SURGERY
Payer: COMMERCIAL

## 2022-04-22 VITALS
TEMPERATURE: 97.9 F | HEART RATE: 72 BPM | RESPIRATION RATE: 16 BRPM | SYSTOLIC BLOOD PRESSURE: 113 MMHG | WEIGHT: 172 LBS | HEIGHT: 63 IN | DIASTOLIC BLOOD PRESSURE: 88 MMHG | BODY MASS INDEX: 30.48 KG/M2 | OXYGEN SATURATION: 98 %

## 2022-04-22 LAB — COLONOSCOPY: NORMAL

## 2022-04-22 PROCEDURE — 45380 COLONOSCOPY AND BIOPSY: CPT | Mod: PT | Performed by: SURGERY

## 2022-04-22 PROCEDURE — 88305 TISSUE EXAM BY PATHOLOGIST: CPT | Mod: TC | Performed by: SURGERY

## 2022-04-22 PROCEDURE — 45385 COLONOSCOPY W/LESION REMOVAL: CPT | Mod: PT | Performed by: SURGERY

## 2022-04-22 PROCEDURE — 250N000009 HC RX 250: Performed by: SURGERY

## 2022-04-22 PROCEDURE — 45380 COLONOSCOPY AND BIOPSY: CPT | Mod: PT,XU

## 2022-04-22 PROCEDURE — 88305 TISSUE EXAM BY PATHOLOGIST: CPT | Mod: 26 | Performed by: PATHOLOGY

## 2022-04-22 PROCEDURE — 258N000003 HC RX IP 258 OP 636: Performed by: SURGERY

## 2022-04-22 PROCEDURE — 370N000017 HC ANESTHESIA TECHNICAL FEE, PER MIN: Performed by: SURGERY

## 2022-04-22 PROCEDURE — 250N000011 HC RX IP 250 OP 636: Performed by: NURSE ANESTHETIST, CERTIFIED REGISTERED

## 2022-04-22 RX ORDER — SODIUM CHLORIDE, SODIUM LACTATE, POTASSIUM CHLORIDE, CALCIUM CHLORIDE 600; 310; 30; 20 MG/100ML; MG/100ML; MG/100ML; MG/100ML
INJECTION, SOLUTION INTRAVENOUS CONTINUOUS
Status: DISCONTINUED | OUTPATIENT
Start: 2022-04-22 | End: 2022-04-22 | Stop reason: HOSPADM

## 2022-04-22 RX ORDER — LIDOCAINE 40 MG/G
CREAM TOPICAL
Status: DISCONTINUED | OUTPATIENT
Start: 2022-04-22 | End: 2022-04-22 | Stop reason: HOSPADM

## 2022-04-22 RX ORDER — PROPOFOL 10 MG/ML
INJECTION, EMULSION INTRAVENOUS PRN
Status: DISCONTINUED | OUTPATIENT
Start: 2022-04-22 | End: 2022-04-22

## 2022-04-22 RX ORDER — PROPOFOL 10 MG/ML
INJECTION, EMULSION INTRAVENOUS CONTINUOUS PRN
Status: DISCONTINUED | OUTPATIENT
Start: 2022-04-22 | End: 2022-04-22

## 2022-04-22 RX ADMIN — PROPOFOL 200 MCG/KG/MIN: 10 INJECTION, EMULSION INTRAVENOUS at 09:11

## 2022-04-22 RX ADMIN — LIDOCAINE HYDROCHLORIDE 0.1 ML: 10 INJECTION, SOLUTION EPIDURAL; INFILTRATION; INTRACAUDAL; PERINEURAL at 09:02

## 2022-04-22 RX ADMIN — PROPOFOL 40 MG: 10 INJECTION, EMULSION INTRAVENOUS at 09:11

## 2022-04-22 RX ADMIN — PROPOFOL 40 MG: 10 INJECTION, EMULSION INTRAVENOUS at 09:26

## 2022-04-22 RX ADMIN — SODIUM CHLORIDE, POTASSIUM CHLORIDE, SODIUM LACTATE AND CALCIUM CHLORIDE: 600; 310; 30; 20 INJECTION, SOLUTION INTRAVENOUS at 09:01

## 2022-04-22 NOTE — LETTER
Romina Turner  [address] [place] [id number]    April 26, 2022    Dear Romina,  This letter is written to inform you of the results of your recent colonoscopy.  Your examination showed polyp(s) in your ascending colon and descending colon. All polyps were removed in their entirety and sent for review by a pathologist. As you will see on the pathology report below, the tissue(s) were tubular adenomatous polyps. Your examination was otherwise without abnormality.    Final Diagnosis   A.  Colon, ascending, polypectomy-  Tubular adenoma, no evidence of high grade dysplasia or invasive malignancy    B.  Colon, descending, polypectomy-  Quantity insufficient for diagnosis         Adenomatous polyps are entirely benign (non-cancerous); however, patients who have developed these polyps are at an increased risk for developing additional polyps in the future. If these are not eventually removed, there is a risk of developing colon cancer. We will advise more frequent examinations with you because of the risk associated with this type of polyp.    Given these findings, I recommend that you undergo a repeat colonoscopy in 7 year(s) for surveillance. We will enter you into a recall system so you receive a reminder closer to the time that you are due for repeat examination.     Please remember that this recommendation is made with the understanding that you are not experiencing persistent changes in bowel function, bleeding per rectum, and/or significant abdominal pain. If you experience these symptoms, please contact your primary care provider for a further evaluation.     If you have any questions or concerns about the results of your colonoscopy or the appropriate follow-up, please contact my assistant at [phone].    Sincerely,        [facility], [facility],   [facility]  ___

## 2022-04-22 NOTE — ANESTHESIA CARE TRANSFER NOTE
Patient: Romina Turner    Procedure: Procedure(s):  COLONOSCOPY, FLEXIBLE, WITH LESION REMOVAL USING SNARE       Diagnosis: Screen for colon cancer [Z12.11]  Diagnosis Additional Information: No value filed.    Anesthesia Type:   General     Note:    Oropharynx: oropharynx clear of all foreign objects and spontaneously breathing  Level of Consciousness: drowsy  Oxygen Supplementation: room air    Independent Airway: airway patency satisfactory and stable  Dentition: dentition unchanged  Vital Signs Stable: post-procedure vital signs reviewed and stable  Report to RN Given: handoff report given  Patient transferred to: Phase II    Handoff Report: Identifed the Patient, Identified the Reponsible Provider, Reviewed the pertinent medical history, Discussed the surgical course, Reviewed Intra-OP anesthesia mangement and issues during anesthesia, Set expectations for post-procedure period and Allowed opportunity for questions and acknowledgement of understanding      Vitals:  Vitals Value Taken Time   BP     Temp     Pulse     Resp     SpO2         Electronically Signed By: ADRIENNE Witt CRNA  April 22, 2022  9:32 AM

## 2022-04-22 NOTE — H&P
[facility]    Pre-Endoscopy History and Physical     Romina Turner MRN# [id number]   YOB: 1975 Age: 46 year old     Date of Procedure: 4/22/2022  Primary care provider: Allyn Carballo  Type of Endoscopy: Colonoscopy with possible biopsy, possible polypectomy  Reason for Procedure: screening  Type of Anesthesia Anticipated: MAC    HPI:    Romina is a 46 year old female who will be undergoing the above procedure.  1st screening; no blood thinner; no famhx of colon cancer    A history and physical has been performed. The patient's medications and allergies have been reviewed. The risks and benefits of the procedure and the sedation options and risks were discussed with the patient.  All questions were answered and informed consent was obtained.      She denies a personal or family history of anesthesia complications or bleeding disorders.     Patient Active Problem List   Diagnosis     CARDIOVASCULAR SCREENING; LDL GOAL LESS THAN 160     Depression with anxiety     Migraine without status migrainosus, not intractable, unspecified migraine type     Seizure disorder (H)     Encounter for screening for cardiovascular disorders     Hypertension goal BP (blood pressure) < 140/90        History reviewed. No pertinent past medical history.     History reviewed. No pertinent surgical history.    Social History     Tobacco Use     Smoking status: Never Smoker     Smokeless tobacco: Never Used   Substance Use Topics     Alcohol use: Yes     Comment: RARE       Family History   Problem Relation Age of Onset     Hypertension Mother      Eye Disorder Mother      Heart Disease Mother      Depression Mother      Hyperlipidemia Mother      Cancer Father         BLADER     Family History Negative No family hx of        Prior to Admission medications    Medication Sig Start Date End Date Taking? Authorizing Provider   fluticasone (FLONASE) 50 MCG/ACT nasal spray Spray 2 sprays into both  "nostrils daily 3/11/22  Yes Allyn Carballo APRN CNP   lamoTRIgine (LAMICTAL) 100 MG tablet Take 1 tablet (100 mg) by mouth daily 8/5/19  Yes He Duarte MD   loratadine (CLARITIN) 10 MG tablet Take 10 mg by mouth daily   Yes Reported, Patient   losartan (COZAAR) 25 MG tablet Take 2 tablets (50 mg) by mouth daily 4/11/22  Yes Allyn Carballo APRN CNP   rizatriptan (MAXALT) 10 MG tablet Take 1 tablet (10 mg) by mouth at onset of headache for migraine May repeat in 2 hours. Max 3 tablets/24 hours. 8/5/19  Yes He Duarte MD       Allergies   Allergen Reactions     Pcn [Penicillins] Rash     Sulfa Drugs Rash        REVIEW OF SYSTEMS:   5 point ROS negative except as noted above in HPI, including Gen., Resp., CV, GI &  system review.    PHYSICAL EXAM:   BP (!) 126/90 (BP Location: Right arm)   Pulse 56   Temp 97.9  F (36.6  C) (Oral)   Resp 16   Ht 1.594 m (5' 2.75\")   Wt 78 kg (172 lb)   LMP 03/07/2022   SpO2 98%   BMI 30.71 kg/m   Estimated body mass index is 30.71 kg/m  as calculated from the following:    Height as of this encounter: 1.594 m (5' 2.75\").    Weight as of this encounter: 78 kg (172 lb).   Constitutional: Awake, alert, no acute distress.  Eyes: No scleral icterus.  Conjunctiva are without injection.  ENMT: Mucous membranes moist, dentition and gums are intact.   Neck: Soft, supple, trachea midline.    Endocrine: n/a   Lymphatic: There is no cervical, submandibularadenopathy.  Respiratory: normal effortgs   Cardiovascular: S1, S2  Abdomen: Non-distended, non-tender,  No masses,  Musculoskeletal: No spinal or CVA tenderness. Full range of motion in the upper and lower extremities.    Skin: No skin rashes or lesions to inspection.  No petechia.    Neurologic: alerted and oriented 3x  Psychiatric: The patient's affect is not blunted and mood is appropriate.  DIAGNOSTICS:    Not indicated    IMPRESSION   ASA Class 2 - Mild systemic disease    PLAN:   Plan " for Colonoscopy with possible biopsy, possible polypectomy. We discussed the risks, benefits and alternatives and the patient wished to proceed.  Patient is cleared for the above procedure.    The above has been forwarded to the consulting provider.    [facility], [facility]

## 2022-04-22 NOTE — ANESTHESIA POSTPROCEDURE EVALUATION
Patient: Romina Turner    Procedure: Procedure(s):  COLONOSCOPY, FLEXIBLE, WITH LESION REMOVAL USING SNARE       Anesthesia Type:  General    Note:  Disposition: Outpatient   Postop Pain Control: Uneventful            Sign Out: Well controlled pain   PONV: No   Neuro/Psych: Uneventful            Sign Out: Acceptable/Baseline neuro status   Airway/Respiratory: Uneventful            Sign Out: Acceptable/Baseline resp. status   CV/Hemodynamics: Uneventful            Sign Out: Acceptable CV status; No obvious hypovolemia; No obvious fluid overload   Other NRE: NONE   DID A NON-ROUTINE EVENT OCCUR? No           Last vitals:  Vitals Value Taken Time   /75 04/22/22 0933   Temp     Pulse 72 04/22/22 0933   Resp 14 04/22/22 0934   SpO2 99 % 04/22/22 0941   Vitals shown include unvalidated device data.    Electronically Signed By: ADRIENNE Witt CRNA  April 22, 2022  9:42 AM

## 2022-04-25 LAB
PATH REPORT.COMMENTS IMP SPEC: NORMAL
PATH REPORT.COMMENTS IMP SPEC: NORMAL
PATH REPORT.FINAL DX SPEC: NORMAL
PATH REPORT.GROSS SPEC: NORMAL
PATH REPORT.MICROSCOPIC SPEC OTHER STN: NORMAL
PATH REPORT.RELEVANT HX SPEC: NORMAL
PHOTO IMAGE: NORMAL

## 2022-05-16 DIAGNOSIS — I10 HYPERTENSION GOAL BP (BLOOD PRESSURE) < 140/90: ICD-10-CM

## 2022-05-16 RX ORDER — LOSARTAN POTASSIUM 25 MG/1
50 TABLET ORAL DAILY
Qty: 60 TABLET | Refills: 0 | Status: SHIPPED | OUTPATIENT
Start: 2022-05-16 | End: 2022-06-22

## 2022-05-24 NOTE — TELEPHONE ENCOUNTER
"Romina Turner is a 46 year old year old patient who comes in today for a Blood Pressure check because of new medication, she takes both every night at 10 PM.   Patient is taking medication as prescribed  Patient is tolerating medications well.  Patient is not monitoring Blood Pressure at home  Current complaints: cold, white,tingling and numb fingers and toes started approximately 1 week after starting hydrochlorothiazide and lisinopril. Happens approximately 45 minutes every night from 5PM unitl 10 PM until she warms up. Feels \"a little off\" in the mornings.\"a little empty\".   Disposition:  Talked with patient about maybe trying to take the hydrochlorothiazide in the morning and lisinporil at night. Also instructed to take hydrochlorothiazide (1/2) tab per telephone encounter from 02/04/22 per Allyn Carballo. Recheck with RN for BP check only again in 1 week.  Jessica BRADLEY RN  "
no

## 2022-06-22 DIAGNOSIS — I10 HYPERTENSION GOAL BP (BLOOD PRESSURE) < 140/90: ICD-10-CM

## 2022-06-22 RX ORDER — LOSARTAN POTASSIUM 25 MG/1
50 TABLET ORAL DAILY
Qty: 60 TABLET | Refills: 0 | Status: SHIPPED | OUTPATIENT
Start: 2022-06-22 | End: 2022-07-29

## 2022-07-28 DIAGNOSIS — I10 HYPERTENSION GOAL BP (BLOOD PRESSURE) < 140/90: ICD-10-CM

## 2022-07-29 RX ORDER — LOSARTAN POTASSIUM 25 MG/1
50 TABLET ORAL DAILY
Qty: 60 TABLET | Refills: 0 | Status: SHIPPED | OUTPATIENT
Start: 2022-07-29 | End: 2022-09-07

## 2022-07-29 NOTE — TELEPHONE ENCOUNTER
Prescription approved per [facility] Refill Protocol     Mariann Francisco     RN MSN

## 2022-09-06 DIAGNOSIS — I10 HYPERTENSION GOAL BP (BLOOD PRESSURE) < 140/90: ICD-10-CM

## 2022-09-07 RX ORDER — LOSARTAN POTASSIUM 25 MG/1
50 TABLET ORAL DAILY
Qty: 60 TABLET | Refills: 0 | Status: SHIPPED | OUTPATIENT
Start: 2022-09-07

## 2022-11-20 ENCOUNTER — HEALTH MAINTENANCE LETTER (OUTPATIENT)
Age: 47
End: 2022-11-20

## 2023-04-15 ENCOUNTER — HEALTH MAINTENANCE LETTER (OUTPATIENT)
Age: 48
End: 2023-04-15

## 2023-07-08 ENCOUNTER — HEALTH MAINTENANCE LETTER (OUTPATIENT)
Age: 48
End: 2023-07-08

## 2024-06-22 ENCOUNTER — HEALTH MAINTENANCE LETTER (OUTPATIENT)
Age: 49
End: 2024-06-22

## 2024-11-07 NOTE — TELEPHONE ENCOUNTER
Just reviewing message now.  Procedure already completed.    Allyn Carballo, DNP, APRN-CNP   
Reason for Call:  Other appointment    Detailed comments: Patient has a colonoscopy scheduled for tomorrow. She is asking I there is anything else she can do besides a colonoscopy.      Phone Number Patient can be reached at: Home number on file [phone] (home)    Best Time: any    Can we leave a detailed message on this number? YES    Call taken on 4/21/2022 at 4:21 PM by Dina Armendariz      
07-Nov-2024 19:30

## 2025-02-19 ENCOUNTER — MEDICAL CORRESPONDENCE (OUTPATIENT)
Dept: HEALTH INFORMATION MANAGEMENT | Facility: CLINIC | Age: 50
End: 2025-02-19
Payer: COMMERCIAL

## (undated) DEVICE — ENDO SNARE EXACTO COLD 9MM LOOP 2.4MMX230CM 00711115

## (undated) DEVICE — ENDO FORCEP ENDOJAW BIOPSY 3.7MMX230CM FB-222U

## (undated) RX ORDER — GLYCOPYRROLATE 0.2 MG/ML
INJECTION, SOLUTION INTRAMUSCULAR; INTRAVENOUS
Status: DISPENSED
Start: 2022-04-22